# Patient Record
Sex: FEMALE | Race: WHITE | Employment: OTHER | URBAN - METROPOLITAN AREA
[De-identification: names, ages, dates, MRNs, and addresses within clinical notes are randomized per-mention and may not be internally consistent; named-entity substitution may affect disease eponyms.]

---

## 2017-12-28 ENCOUNTER — HOSPITAL ENCOUNTER (OUTPATIENT)
Dept: ULTRASOUND IMAGING | Age: 62
Discharge: HOME | End: 2017-12-28
Payer: COMMERCIAL

## 2017-12-28 DIAGNOSIS — I65.23 BILATERAL CAROTID ARTERY STENOSIS: ICD-10-CM

## 2017-12-28 PROCEDURE — 93880 EXTRACRANIAL BILAT STUDY: CPT

## 2019-05-28 LAB
ERYTHROCYTE [DISTWIDTH] IN BLOOD BY AUTOMATED COUNT: 13.5 % (ref 11.5–14)
HCT VFR BLD CALC: 39.7 % (ref 36–46)
HEMOGLOBIN: 12.6 G/DL (ref 12–16)
MCHC RBC AUTO-ENTMCNC: 31.7 G/DL (ref 32–36)
MCV RBC AUTO: 95 FL (ref 80–100)
PLATELET # BLD: 252 X10E9/L (ref 150–450)
RBC # BLD: 4.18 X10E12/L (ref 4–5.2)
T4 FREE: 0.5 NG/DL (ref 0.61–1.2)
TSH SERPL DL<=0.05 MIU/L-ACNC: 2.77 MIU/L (ref 0.44–3.9)
WBC: 4.7 X10E9/L (ref 4.4–11.3)

## 2019-10-20 ENCOUNTER — OFFICE VISIT (OUTPATIENT)
Dept: FAMILY MEDICINE CLINIC | Age: 64
End: 2019-10-20
Payer: COMMERCIAL

## 2019-10-20 VITALS
HEIGHT: 66 IN | BODY MASS INDEX: 57.36 KG/M2 | HEART RATE: 73 BPM | OXYGEN SATURATION: 95 % | SYSTOLIC BLOOD PRESSURE: 126 MMHG | TEMPERATURE: 97.9 F | DIASTOLIC BLOOD PRESSURE: 74 MMHG

## 2019-10-20 DIAGNOSIS — L03.032 CELLULITIS OF LEFT TOE: Primary | ICD-10-CM

## 2019-10-20 PROCEDURE — 99213 OFFICE O/P EST LOW 20 MIN: CPT | Performed by: NURSE PRACTITIONER

## 2019-10-20 PROCEDURE — 3017F COLORECTAL CA SCREEN DOC REV: CPT | Performed by: NURSE PRACTITIONER

## 2019-10-20 PROCEDURE — G8598 ASA/ANTIPLAT THER USED: HCPCS | Performed by: NURSE PRACTITIONER

## 2019-10-20 PROCEDURE — 4004F PT TOBACCO SCREEN RCVD TLK: CPT | Performed by: NURSE PRACTITIONER

## 2019-10-20 PROCEDURE — G8427 DOCREV CUR MEDS BY ELIG CLIN: HCPCS | Performed by: NURSE PRACTITIONER

## 2019-10-20 PROCEDURE — G8419 CALC BMI OUT NRM PARAM NOF/U: HCPCS | Performed by: NURSE PRACTITIONER

## 2019-10-20 PROCEDURE — G8484 FLU IMMUNIZE NO ADMIN: HCPCS | Performed by: NURSE PRACTITIONER

## 2019-10-20 RX ORDER — DOXYCYCLINE HYCLATE 100 MG
100 TABLET ORAL 2 TIMES DAILY
Qty: 20 TABLET | Refills: 0 | Status: SHIPPED | OUTPATIENT
Start: 2019-10-20 | End: 2019-10-30

## 2019-10-20 RX ORDER — IRBESARTAN 150 MG/1
150 TABLET ORAL DAILY
COMMUNITY
Start: 2019-09-30

## 2019-10-20 RX ORDER — CHLORTHALIDONE 25 MG/1
25 TABLET ORAL DAILY
Status: ON HOLD | COMMUNITY
Start: 2019-09-30 | End: 2021-10-22 | Stop reason: HOSPADM

## 2019-10-20 ASSESSMENT — PATIENT HEALTH QUESTIONNAIRE - PHQ9
1. LITTLE INTEREST OR PLEASURE IN DOING THINGS: 0
SUM OF ALL RESPONSES TO PHQ9 QUESTIONS 1 & 2: 0
2. FEELING DOWN, DEPRESSED OR HOPELESS: 0
SUM OF ALL RESPONSES TO PHQ QUESTIONS 1-9: 0
SUM OF ALL RESPONSES TO PHQ QUESTIONS 1-9: 0

## 2019-10-20 ASSESSMENT — ENCOUNTER SYMPTOMS: SHORTNESS OF BREATH: 0

## 2019-11-18 ENCOUNTER — OFFICE VISIT (OUTPATIENT)
Dept: NEUROLOGY | Age: 64
End: 2019-11-18
Payer: COMMERCIAL

## 2019-11-18 VITALS
SYSTOLIC BLOOD PRESSURE: 172 MMHG | DIASTOLIC BLOOD PRESSURE: 69 MMHG | BODY MASS INDEX: 47.09 KG/M2 | WEIGHT: 293 LBS | HEIGHT: 66 IN | HEART RATE: 59 BPM

## 2019-11-18 DIAGNOSIS — I65.23 CAROTID STENOSIS, BILATERAL: ICD-10-CM

## 2019-11-18 DIAGNOSIS — G40.309 NONINTRACTABLE GENERALIZED IDIOPATHIC EPILEPSY WITHOUT STATUS EPILEPTICUS (HCC): ICD-10-CM

## 2019-11-18 DIAGNOSIS — I63.139 CEREBRAL INFARCTION DUE TO EMBOLISM OF CAROTID ARTERY, UNSPECIFIED BLOOD VESSEL LATERALITY (HCC): ICD-10-CM

## 2019-11-18 PROCEDURE — 99214 OFFICE O/P EST MOD 30 MIN: CPT | Performed by: PSYCHIATRY & NEUROLOGY

## 2019-11-18 RX ORDER — COLCHICINE 0.6 MG/1
CAPSULE ORAL
Refills: 1 | COMMUNITY
Start: 2019-10-26

## 2019-11-18 ASSESSMENT — ENCOUNTER SYMPTOMS
TROUBLE SWALLOWING: 0
VOMITING: 0
SHORTNESS OF BREATH: 0
PHOTOPHOBIA: 0
NAUSEA: 0
BACK PAIN: 0
CHOKING: 0

## 2020-05-18 ENCOUNTER — OFFICE VISIT (OUTPATIENT)
Dept: NEUROLOGY | Age: 65
End: 2020-05-18
Payer: COMMERCIAL

## 2020-05-18 VITALS — DIASTOLIC BLOOD PRESSURE: 62 MMHG | SYSTOLIC BLOOD PRESSURE: 141 MMHG | HEART RATE: 62 BPM

## 2020-05-18 DIAGNOSIS — G40.309 GENERALIZED CONVULSIVE EPILEPSY (HCC): ICD-10-CM

## 2020-05-18 PROBLEM — G45.9 TIA (TRANSIENT ISCHEMIC ATTACK): Status: ACTIVE | Noted: 2020-05-18

## 2020-05-18 PROBLEM — G47.33 OBSTRUCTIVE SLEEP APNEA SYNDROME: Status: ACTIVE | Noted: 2020-05-18

## 2020-05-18 LAB
ALBUMIN SERPL-MCNC: 4 G/DL (ref 3.5–4.6)
ALP BLD-CCNC: 107 U/L (ref 40–130)
ALT SERPL-CCNC: 14 U/L (ref 0–33)
AST SERPL-CCNC: 13 U/L (ref 0–35)
BILIRUB SERPL-MCNC: <0.2 MG/DL (ref 0.2–0.7)
BILIRUBIN DIRECT: <0.2 MG/DL (ref 0–0.4)
BILIRUBIN, INDIRECT: NORMAL MG/DL (ref 0–0.6)
PHENOBARBITAL LEVEL: 17.2 UG/ML (ref 10–30)
TOTAL PROTEIN: 7.3 G/DL (ref 6.3–8)
VITAMIN D 25-HYDROXY: 23.6 NG/ML (ref 30–100)

## 2020-05-18 PROCEDURE — 3017F COLORECTAL CA SCREEN DOC REV: CPT | Performed by: PSYCHIATRY & NEUROLOGY

## 2020-05-18 PROCEDURE — 1036F TOBACCO NON-USER: CPT | Performed by: PSYCHIATRY & NEUROLOGY

## 2020-05-18 PROCEDURE — G8427 DOCREV CUR MEDS BY ELIG CLIN: HCPCS | Performed by: PSYCHIATRY & NEUROLOGY

## 2020-05-18 PROCEDURE — 99214 OFFICE O/P EST MOD 30 MIN: CPT | Performed by: PSYCHIATRY & NEUROLOGY

## 2020-05-18 PROCEDURE — G8417 CALC BMI ABV UP PARAM F/U: HCPCS | Performed by: PSYCHIATRY & NEUROLOGY

## 2020-05-18 RX ORDER — RIVAROXABAN 20 MG/1
20 TABLET, FILM COATED ORAL DAILY
COMMUNITY
Start: 2020-05-01

## 2020-05-18 NOTE — PROGRESS NOTES
Subjective:      Patient ID: Pedro Jay is a 63 y.o. female who presents today for:  Chief Complaint   Patient presents with    6 Month Follow-Up     Patient states that she is doing good but does have gout right now and seems to have it under control right now. No vision problems other then being due for new glasses. HPI's 62-year-old right-handed female history of right occipital infarct with generalized seizures and carotid stenosis. Patient continues on phenytoin 100 mg 6 at night and phenobarbital 129.6 mg by mouth. She is a seizure she denies any side effects. She is on Xarelto without any bleeding or bruising. Continue to discuss new generation anticonvulsants though this can be tano in terms of changing medications and therefore she is happy with the medication wanted to be on this medications and therefore we had recommended vitamin D and calcium supplements due to expectation of osteoporosis with phenytoin. Last documented levels in 2015'  Denies any recent falls injuries trauma no bleeding bruising choking drooling and she is independent in her activity of daily living. She is complains of visual dysfunction and is due to be seen for new glasses. Past Medical History:   Diagnosis Date    Atrial fibrillation ([address] 12/9/2014    Carotid stenosis, bilateral     Cerebral infarction due to embolism ([address]     Endometrial cancer ([address] 3/16/2015    Family history of premature CAD 12/9/2014    Generalized convulsive epilepsy ([address] 3/16/2015    Hyperlipidemia     Idiopathic generalized epilepsy ([address]     Obesity     Occipital infarction ([address] 12/9/2014     No past surgical history on file.   Social History     Socioeconomic History    Marital status: Single     Spouse name: Not on file    Number of children: Not on file    Years of education: Not on file    Highest education level: Not on file   Occupational History    Not on file   Social Needs    Financial resource strain: for levels today including vitamin D levels as well. Plan:      No orders of the defined types were placed in this encounter. No orders of the defined types were placed in this encounter. Return in about 6 months (around 11/18/2020).       Gordo Kwok MD

## 2020-11-16 ENCOUNTER — OFFICE VISIT (OUTPATIENT)
Dept: NEUROLOGY | Age: 65
End: 2020-11-16
Payer: COMMERCIAL

## 2020-11-16 VITALS
DIASTOLIC BLOOD PRESSURE: 65 MMHG | HEART RATE: 49 BPM | WEIGHT: 293 LBS | BODY MASS INDEX: 54.34 KG/M2 | SYSTOLIC BLOOD PRESSURE: 138 MMHG

## 2020-11-16 DIAGNOSIS — I63.50 CEREBRAL ARTERY OCCLUSION WITH CEREBRAL INFARCTION (HCC): ICD-10-CM

## 2020-11-16 DIAGNOSIS — G40.309 GENERALIZED CONVULSIVE EPILEPSY (HCC): Primary | ICD-10-CM

## 2020-11-16 DIAGNOSIS — G93.89 ENCEPHALOMALACIA: ICD-10-CM

## 2020-11-16 PROCEDURE — G8427 DOCREV CUR MEDS BY ELIG CLIN: HCPCS | Performed by: PSYCHIATRY & NEUROLOGY

## 2020-11-16 PROCEDURE — 3017F COLORECTAL CA SCREEN DOC REV: CPT | Performed by: PSYCHIATRY & NEUROLOGY

## 2020-11-16 PROCEDURE — G8417 CALC BMI ABV UP PARAM F/U: HCPCS | Performed by: PSYCHIATRY & NEUROLOGY

## 2020-11-16 PROCEDURE — G8484 FLU IMMUNIZE NO ADMIN: HCPCS | Performed by: PSYCHIATRY & NEUROLOGY

## 2020-11-16 PROCEDURE — 99214 OFFICE O/P EST MOD 30 MIN: CPT | Performed by: PSYCHIATRY & NEUROLOGY

## 2020-11-16 PROCEDURE — 1036F TOBACCO NON-USER: CPT | Performed by: PSYCHIATRY & NEUROLOGY

## 2020-11-16 ASSESSMENT — ENCOUNTER SYMPTOMS
CHOKING: 0
BACK PAIN: 0
TROUBLE SWALLOWING: 0
VOMITING: 0
NAUSEA: 0
PHOTOPHOBIA: 0
COLOR CHANGE: 0
SHORTNESS OF BREATH: 0

## 2020-11-16 NOTE — PROGRESS NOTES
use: No    Sexual activity: Not on file   Lifestyle    Physical activity     Days per week: Not on file     Minutes per session: Not on file    Stress: Not on file   Relationships    Social connections     Talks on phone: Not on file     Gets together: Not on file     Attends Evangelical service: Not on file     Active member of club or organization: Not on file     Attends meetings of clubs or organizations: Not on file     Relationship status: Not on file    Intimate partner violence     Fear of current or ex partner: Not on file     Emotionally abused: Not on file     Physically abused: Not on file     Forced sexual activity: Not on file   Other Topics Concern    Not on file   Social History Narrative    Not on file     Family History   Family history unknown: Yes     No Known Allergies    Current Outpatient Medications   Medication Sig Dispense Refill    XARELTO 20 MG TABS tablet Take 20 mg by mouth daily       MITIGARE 0.6 MG capsule TK ONE C PO BID PRN  1    chlorthalidone (HYGROTON) 25 MG tablet Take 25 mg by mouth daily       irbesartan (AVAPRO) 150 MG tablet Take 150 mg by mouth daily       NYSTATIN, TOPICAL, POWD Apply  topically.  PHENobarbital (LUMINAL) 64.8 MG tablet Take 129.6 mg by mouth daily.  phenytoin (DILANTIN) 100 MG ER capsule Take 100 mg by mouth daily. take 6 at night       No current facility-administered medications for this visit. Review of Systems   Constitutional: Negative for fever. HENT: Negative for ear pain, tinnitus and trouble swallowing. Eyes: Negative for photophobia and visual disturbance. Respiratory: Negative for choking and shortness of breath. Cardiovascular: Negative for chest pain and palpitations. Gastrointestinal: Negative for nausea and vomiting. Musculoskeletal: Negative for back pain, gait problem, joint swelling, myalgias, neck pain and neck stiffness. Skin: Negative for color change.    Allergic/Immunologic: Negative for food allergies. Neurological: Negative for dizziness, tremors, seizures, syncope, facial asymmetry, speech difficulty, weakness, light-headedness, numbness and headaches. Psychiatric/Behavioral: Negative for behavioral problems, confusion, hallucinations and sleep disturbance. Objective:   /65 (Site: Right Lower Arm, Position: Sitting, Cuff Size: Medium Adult)   Pulse (!) 49   Wt (!) 336 lb 11.2 oz (152.7 kg)   BMI 54.34 kg/m²     Physical Exam  Vitals signs reviewed. Eyes:      Pupils: Pupils are equal, round, and reactive to light. Neck:      Musculoskeletal: Normal range of motion. Cardiovascular:      Rate and Rhythm: Normal rate and regular rhythm. Heart sounds: No murmur. Pulmonary:      Effort: Pulmonary effort is normal.      Breath sounds: Normal breath sounds. Abdominal:      General: Bowel sounds are normal.   Musculoskeletal: Normal range of motion. Skin:     General: Skin is warm. Neurological:      Mental Status: She is alert and oriented to person, place, and time. Cranial Nerves: No cranial nerve deficit. Sensory: No sensory deficit. Motor: No abnormal muscle tone. Coordination: Coordination normal.      Deep Tendon Reflexes: Reflexes are normal and symmetric. Babinski sign absent on the right side. Babinski sign absent on the left side. Psychiatric:         Mood and Affect: Mood normal.         Us Carotid Artery Bilateral    Result Date: 12/29/2017  EXAMINATION: US CAROTID ARTERY BILATERAL: DATE AND TIME: 12/28/2017 at 11:29 AM. CLINICAL HISTORY: BILATERAL CAROTID ARTERY STENOSIS. COMPARISON: None. TECHNIQUE: Carotid duplex sonograms which include gray scale and color flow evaluation are complimented with spectral waveform analysis. Please refer to chart below for specific carotid velocity measurements.  Validated velocity measurements with angiographic measurements, velocity criteria are extrapolated from diameter data as defined by the Society of Radiologist in [facility] Radiology 2003; 396;[id number]\". FINDINGS: No significant plaque formation is seen. RIGHT ICA: < 50%. No significant plaque formation is seen. LEFT ICA: <50%. Antegrade flow in both vertebral arteries. ARTERIAL BLOOD FLOW VELOCITY RIGHT PS                                               Prox CCA    91 cm/s             Mid CCA     81 cm/s              Dist CCA    73 cm/s              Prox ICA    78 cm/s               Mid ICA     82 cm/s            Dist ICA     94 cm/s             Prox ECA    131 cm/s             Prox VERT   antegrade cm/s              ICA/CCA     1.17                         LEFT PS Prox CCA    73 cm/s Mid CCA     92 cm/s Dist CCA    53 cm/s Prox ICA    75 cm/s Mid ICA     77 cm/s Dist ICA     95 cm/s Prox ECA    77 cm/s Prox VERT   antegrade ICA/CCA     1.03     <50% STENOSIS IN THE RIGHT ICA. <50% STENOSIS IN THE LEFT ICA. The degree of stenosis recorded on this exam uses the same method of stratification used in the NASCET trials. This complies with ACR practice guidelines and the Society of radiology in ultrasound consensus statement and provides adequate information for  clinical decision making. Society of Radiologists in Ultrasound (SRU) consensus statement was used to estimate internal carotid artery stenosis.        Lab Results   Component Value Date    WBC 4.7 05/28/2019    WBC 4.9 03/05/2016    RBC 4.18 05/28/2019    HGB 12.6 05/28/2019    HCT 39.7 05/28/2019    MCV 95 05/28/2019    MCH 31.0 03/05/2016    MCHC 31.7 05/28/2019    RDW 13.7 03/05/2016     05/28/2019    MPV 8.4 10/28/2014     Lab Results   Component Value Date     04/15/2016    K 4.3 04/15/2016     04/15/2016    CO2 22 04/15/2016    BUN 23 04/15/2016    CREATININE 1.07 04/15/2016    GFRAA >60.0 04/15/2016    LABGLOM 52.4 04/15/2016    GLUCOSE 106 03/03/2016    PROT 7.3 05/18/2020    LABALBU 4.0 05/18/2020    CALCIUM 8.5 03/03/2016    BILITOT <0.2 05/18/2020    ALKPHOS 107 05/18/2020    AST 13 05/18/2020    ALT 14 05/18/2020     Lab Results   Component Value Date    PROTIME 10.9 03/01/2016    INR 1.0 03/01/2016     Lab Results   Component Value Date    TSH 2.77 05/28/2019     Lab Results   Component Value Date    TRIG 152 03/01/2016    HDL 45 03/01/2016    LDLCALC 150 03/01/2016     No results found for: Freeda Blades, LABBENZ, CANNAB, COCAINESCRN, LABMETH, OPIATESCREENURINE, PHENCYCLIDINESCREENURINE, PPXUR, ETOH  No results found for: LITHIUM, DILFRTOT, VALPROATE    Assessment:       Diagnosis Orders   1. Generalized convulsive epilepsy (HCC)  Phenytoin Level, Total    Phenobarbital Level    CBC Auto Differential    Hepatic Function Panel   2. Cerebral artery occlusion with cerebral infarction ([facility]     3. Encephalomalacia     Poststroke seizures patient last seen in August 2020. Patient continues on phenobarbital and Dilantin will continue to be on the same dose for many years. She is somewhat resistant to change in medications as we had recommended changing her Dilantin given her age as this may cause osteoporosis she is recommended take vitamin D and calcium which she tells me she is taking. Is happy with the medication therefore we had not changed this. Patient has a large encephalomalacia in the left with a stroke in the past.  This likely is epileptogenic. We have continue to discuss that she should obtain liver test which she has never done in the last past months. We will again continue to follow her she does report that she has some occasional seizures but she is not concerned. Plan:      Orders Placed This Encounter   Procedures    Phenytoin Level, Total     Copy to Dr Debi Rodríguez     Standing Status:   Future     Standing Expiration Date:   11/16/2021     Order Specific Question:   Dose Schedule & Time of Last Dose?      Answer:   morning dose    Phenobarbital Level     Copy to Dr Debi Rodríguez     Standing Status:   Future     Standing Expiration Date:   11/16/2021     Order Specific Question:   Dose Schedule & Time of Last Dose? Answer:   morning dose    CBC Auto Differential     Copy to Dr Cody Velasquez     Standing Status:   Future     Standing Expiration Date:   11/16/2021    Hepatic Function Panel     Results to Dr Cody Velasquez     Standing Status:   Future     Standing Expiration Date:   11/16/2021     No orders of the defined types were placed in this encounter. Return in about 6 months (around 5/16/2021).       Mel Phan MD

## 2021-01-24 PROBLEM — G93.89 ENCEPHALOMALACIA: Status: ACTIVE | Noted: 2021-01-24

## 2021-09-29 ENCOUNTER — OFFICE VISIT (OUTPATIENT)
Dept: NEUROLOGY | Age: 66
End: 2021-09-29
Payer: COMMERCIAL

## 2021-09-29 VITALS — DIASTOLIC BLOOD PRESSURE: 94 MMHG | SYSTOLIC BLOOD PRESSURE: 150 MMHG | OXYGEN SATURATION: 94 % | HEART RATE: 61 BPM

## 2021-09-29 DIAGNOSIS — G40.309 NONINTRACTABLE GENERALIZED IDIOPATHIC EPILEPSY WITHOUT STATUS EPILEPTICUS (HCC): Primary | ICD-10-CM

## 2021-09-29 DIAGNOSIS — G47.33 OBSTRUCTIVE SLEEP APNEA SYNDROME: ICD-10-CM

## 2021-09-29 DIAGNOSIS — I63.139 CEREBRAL INFARCTION DUE TO EMBOLISM OF CAROTID ARTERY, UNSPECIFIED BLOOD VESSEL LATERALITY (HCC): ICD-10-CM

## 2021-09-29 DIAGNOSIS — G45.9 TIA (TRANSIENT ISCHEMIC ATTACK): ICD-10-CM

## 2021-09-29 DIAGNOSIS — E66.8 EXTREME OBESITY: ICD-10-CM

## 2021-09-29 DIAGNOSIS — G93.89 ENCEPHALOMALACIA: ICD-10-CM

## 2021-09-29 PROCEDURE — G8427 DOCREV CUR MEDS BY ELIG CLIN: HCPCS | Performed by: PSYCHIATRY & NEUROLOGY

## 2021-09-29 PROCEDURE — G8417 CALC BMI ABV UP PARAM F/U: HCPCS | Performed by: PSYCHIATRY & NEUROLOGY

## 2021-09-29 PROCEDURE — 99214 OFFICE O/P EST MOD 30 MIN: CPT | Performed by: PSYCHIATRY & NEUROLOGY

## 2021-09-29 PROCEDURE — 3017F COLORECTAL CA SCREEN DOC REV: CPT | Performed by: PSYCHIATRY & NEUROLOGY

## 2021-09-29 PROCEDURE — 1036F TOBACCO NON-USER: CPT | Performed by: PSYCHIATRY & NEUROLOGY

## 2021-09-29 ASSESSMENT — ENCOUNTER SYMPTOMS
COLOR CHANGE: 0
PHOTOPHOBIA: 0
CHOKING: 0
BACK PAIN: 0
SHORTNESS OF BREATH: 0
VOMITING: 0
NAUSEA: 0
TROUBLE SWALLOWING: 0

## 2021-09-29 NOTE — PROGRESS NOTES
Subjective:      Patient ID: Stanley Duckworth is a 61 y.o. female who presents today for:  Chief Complaint   Patient presents with    6 Month Follow-Up     Pt states she hasnt had any recent seizures but a couple weeks ago she felt she couldnt wake up she states shes been really dizzy and fatigued but doesnt know if its caused by medications. She states she had one fall and shes dizzy and unsteady on her feet. she states shes always dizzy now. pts sister states she is really concerened. HPI 62-year-old right-handed female with a history of right occipital parietal infarct with a large intracerebral hemorrhage with a transformation generalized seizures. Patient continues on Dilantin. She has dizziness. She did not want to change the Dilantin and she is happy with the medication we had recommended continuation of vitamin D and calcium. The dizziness wasevaluated and does not appear to be orthostatic. Her supine blood pressure is [phone] with a standing blood pressure of 150/94 and this is orthostasis. Carotid ultrasounds are not done her last phenobarbital levels have not been done. Patient continued to be dizzy on and off for the last 2 weeks she had dizziness some years ago which was considered to be vestibular. Patient is not complain tinnitus or hearing loss. Otherwise she does not any issues except she has hypersomnolence and she is not using her CPAP machine as much. Past Medical History:   Diagnosis Date    Atrial fibrillation ([address] 12/9/2014    Carotid stenosis, bilateral     Cerebral infarction due to embolism ([address]     Endometrial cancer ([address] 3/16/2015    Family history of premature CAD 12/9/2014    Generalized convulsive epilepsy ([address] 3/16/2015    Hyperlipidemia     Idiopathic generalized epilepsy ([address]     Obesity     Occipital infarction ([address] 12/9/2014     No past surgical history on file.   Social History     Socioeconomic History    Marital status: Single     Spouse name: Not on file    Number of children: Not on file    Years of education: Not on file    Highest education level: Not on file   Occupational History    Not on file   Tobacco Use    Smoking status: Never Smoker    Smokeless tobacco: Never Used   Substance and Sexual Activity    Alcohol use: No    Drug use: No    Sexual activity: Not on file   Other Topics Concern    Not on file   Social History Narrative    Not on file     Social Determinants of Health     Financial Resource Strain:     Difficulty of Paying Living Expenses:    Food Insecurity:     Worried About Running Out of Food in the Last Year:     [address] in the Last Year:    Transportation Needs:     Lack of Transportation (Medical):  Lack of Transportation (Non-Medical):    Physical Activity:     Days of Exercise per Week:     Minutes of Exercise per Session:    Stress:     Feeling of Stress :    Social Connections:     Frequency of Communication with Friends and Family:     Frequency of Social Gatherings with Friends and Family:     Attends Synagogue Services:     Active Member of Clubs or Organizations:     Attends Club or Organization Meetings:     Marital Status:    Intimate Partner Violence:     Fear of Current or Ex-Partner:     Emotionally Abused:     Physically Abused:     Sexually Abused:      Family History   Family history unknown: Yes     No Known Allergies    Current Outpatient Medications   Medication Sig Dispense Refill    XARELTO 20 MG TABS tablet Take 20 mg by mouth daily       MITIGARE 0.6 MG capsule TK ONE C PO BID PRN  1    chlorthalidone (HYGROTON) 25 MG tablet Take 25 mg by mouth daily       irbesartan (AVAPRO) 150 MG tablet Take 150 mg by mouth daily       NYSTATIN, TOPICAL, POWD Apply  topically.  PHENobarbital (LUMINAL) 64.8 MG tablet Take 129.6 mg by mouth daily.  phenytoin (DILANTIN) 100 MG ER capsule Take 100 mg by mouth daily.  take 6 at night       No current facility-administered medications for this visit. Review of Systems   Constitutional: Negative for fever. HENT: Negative for ear pain, tinnitus and trouble swallowing. Eyes: Negative for photophobia and visual disturbance. Respiratory: Negative for choking and shortness of breath. Cardiovascular: Negative for chest pain and palpitations. Gastrointestinal: Negative for nausea and vomiting. Musculoskeletal: Positive for gait problem. Negative for back pain, joint swelling, myalgias, neck pain and neck stiffness. Skin: Negative for color change. Allergic/Immunologic: Negative for food allergies. Neurological: Positive for dizziness and weakness. Negative for tremors, seizures, syncope, facial asymmetry, speech difficulty, light-headedness, numbness and headaches. Psychiatric/Behavioral: Negative for behavioral problems, confusion, hallucinations and sleep disturbance. Objective:   BP (!) 150/94 (Site: Left Lower Arm, Position: Standing, Cuff Size: Large Adult)   Pulse 61   SpO2 94%     Physical Exam  Vitals reviewed. Eyes:      Pupils: Pupils are equal, round, and reactive to light. Cardiovascular:      Rate and Rhythm: Normal rate and regular rhythm. Heart sounds: No murmur heard. Pulmonary:      Effort: Pulmonary effort is normal.      Breath sounds: Normal breath sounds. Abdominal:      General: Bowel sounds are normal.   Musculoskeletal:         General: Normal range of motion. Cervical back: Normal range of motion. Skin:     General: Skin is warm. Neurological:      Mental Status: She is alert and oriented to person, place, and time. Cranial Nerves: No cranial nerve deficit. Sensory: No sensory deficit. Motor: No abnormal muscle tone. Coordination: Coordination normal.      Deep Tendon Reflexes: Reflexes are normal and symmetric. Babinski sign absent on the right side. Babinski sign absent on the left side.       Comments: Patient is morbidly obese and her blood pressure recordings are in my H&P rest of the examination is entirely normal she is areflexic in the lower extremities some gait ataxia. Psychiatric:         Mood and Affect: Mood normal.         US CAROTID ARTERY BILATERAL    Result Date: 12/29/2017  EXAMINATION: US CAROTID ARTERY BILATERAL: DATE AND TIME: 12/28/2017 at 11:29 AM. CLINICAL HISTORY: BILATERAL CAROTID ARTERY STENOSIS. COMPARISON: None. TECHNIQUE: Carotid duplex sonograms which include gray scale and color flow evaluation are complimented with spectral waveform analysis. Please refer to chart below for specific carotid velocity measurements. Validated velocity measurements with angiographic measurements, velocity criteria are extrapolated from diameter data as defined by the Society of Radiologist in [address] [place] [address] Radiology 2003; 357;[phone]\". FINDINGS: No significant plaque formation is seen. RIGHT ICA: < 50%. No significant plaque formation is seen. LEFT ICA: <50%. Antegrade flow in both vertebral arteries. ARTERIAL BLOOD FLOW VELOCITY RIGHT PS                                               Prox CCA    91 cm/s             Mid CCA     81 cm/s              Dist CCA    73 cm/s              Prox ICA    78 cm/s               Mid ICA     82 cm/s            Dist ICA     94 cm/s             Prox ECA    131 cm/s             Prox VERT   antegrade cm/s              ICA/CCA     1.17                         LEFT PS Prox CCA    73 cm/s Mid CCA     92 cm/s Dist CCA    53 cm/s Prox ICA    75 cm/s Mid ICA     77 cm/s Dist ICA     95 cm/s Prox ECA    77 cm/s Prox VERT   antegrade ICA/CCA     1.03     <50% STENOSIS IN THE RIGHT ICA. <50% STENOSIS IN THE LEFT ICA. The degree of stenosis recorded on this exam uses the same method of stratification used in the NASCET trials. This complies with ACR practice guidelines and the Society of radiology in ultrasound consensus statement and provides adequate information for  clinical decision making. Society of Radiologists in Ultrasound (SRU) consensus statement was used to estimate internal carotid artery stenosis. Lab Results   Component Value Date    WBC 4.7 05/28/2019    WBC 4.9 03/05/2016    RBC 4.18 05/28/2019    HGB 12.6 05/28/2019    HCT 39.7 05/28/2019    MCV 95 05/28/2019    MCH 31.0 03/05/2016    MCHC 31.7 05/28/2019    RDW 13.7 03/05/2016     05/28/2019    MPV 8.4 10/28/2014     Lab Results   Component Value Date     04/15/2016    K 4.3 04/15/2016     04/15/2016    CO2 22 04/15/2016    BUN 23 04/15/2016    CREATININE 1.07 04/15/2016    GFRAA >60.0 04/15/2016    LABGLOM 52.4 04/15/2016    GLUCOSE 106 03/03/2016    PROT 7.3 05/18/2020    LABALBU 4.0 05/18/2020    CALCIUM 8.5 03/03/2016    BILITOT <0.2 05/18/2020    ALKPHOS 107 05/18/2020    AST 13 05/18/2020    ALT 14 05/18/2020     Lab Results   Component Value Date    PROTIME 10.9 03/01/2016    INR 1.0 03/01/2016     Lab Results   Component Value Date    TSH 2.77 05/28/2019     Lab Results   Component Value Date    TRIG 152 03/01/2016    HDL 45 03/01/2016    LDLCALC 150 03/01/2016     No results found for: Beryle Moran, LABBENZ, CANNAB, COCAINESCRN, LABMETH, OPIATESCREENURINE, PHENCYCLIDINESCREENURINE, PPXUR, ETOH  No results found for: LITHIUM, DILFRTOT, VALPROATE    Assessment:       Diagnosis Orders   1. Nonintractable generalized idiopathic epilepsy without status epilepticus ([address]     2. Encephalomalacia     3. 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 TIA (transient ischemic attack)     5. Extreme obesity     6. Obstructive sleep apnea syndrome     Intractable epilepsy which has not reoccurred. Patient is on combination of Dilantin and phenobarbital.  She has multiple other medical issues of concern and she has orthostatics. This may be causing some of her dizziness but she is not becoming bradycardic or hypotensive.   This may need to be monitored medically. I am not quite sure what to add in this situation though I will obtain dilantin  which can cause this as well. Patient was seen here for vascular disease and requires a carotid ultrasound which she had not had. Patient complains of hypersomnolence and sleeps a lot and is likely that she is not using her CPAP machine as she should which is causing issues  Dizziness continues we may have to reevaluate her again      Plan:      No orders of the defined types were placed in this encounter. No orders of the defined types were placed in this encounter. No follow-ups on file.       Sophia Mora MD

## 2021-10-04 DIAGNOSIS — G40.309 GENERALIZED CONVULSIVE EPILEPSY (HCC): Primary | ICD-10-CM

## 2021-10-06 ENCOUNTER — TELEPHONE (OUTPATIENT)
Dept: NEUROLOGY | Age: 66
End: 2021-10-06

## 2021-10-06 RX ORDER — PHENYTOIN SODIUM 100 MG/1
600 CAPSULE, EXTENDED RELEASE ORAL DAILY
Qty: 180 CAPSULE | Refills: 3 | Status: SHIPPED | OUTPATIENT
Start: 2021-10-06 | End: 2022-09-28

## 2021-10-06 NOTE — TELEPHONE ENCOUNTER
Pt seen her PCP today and she looked at her recent blood work and her dilantin level is elevated. She is having some dizziness going on.patIs there any changes that you would like to make at this time.

## 2021-10-08 ENCOUNTER — TELEPHONE (OUTPATIENT)
Dept: NEUROLOGY | Age: 66
End: 2021-10-08

## 2021-10-08 NOTE — TELEPHONE ENCOUNTER
Pt's dilantin was even higher than first testing this time, spoke to Dr. Jose Curry in office and he informed to have pt lower medication to 400mg daily and retest in a week. Also told if she is still feeling as badly as she is to give us a call as she noted a lot of dizziness, and sick feeling. Order for lab is pended in this encounter as well. You are aware of this already. Thank you.

## 2021-10-18 DIAGNOSIS — G40.309 GENERALIZED CONVULSIVE EPILEPSY (HCC): ICD-10-CM

## 2021-10-18 LAB — PHENYTOIN LEVEL: 21.6 UG/ML (ref 10–20)

## 2021-10-19 ENCOUNTER — HOSPITAL ENCOUNTER (INPATIENT)
Age: 66
LOS: 3 days | Discharge: HOME HEALTH | DRG: 641 | End: 2021-10-22
Attending: EMERGENCY MEDICINE | Admitting: INTERNAL MEDICINE
Payer: COMMERCIAL

## 2021-10-19 ENCOUNTER — HOSPITAL ENCOUNTER (OUTPATIENT)
Dept: GENERAL RADIOLOGY | Age: 66
Discharge: HOME | DRG: 641 | End: 2021-10-21
Payer: COMMERCIAL

## 2021-10-19 ENCOUNTER — APPOINTMENT (OUTPATIENT)
Dept: GENERAL RADIOLOGY | Age: 66
DRG: 641 | End: 2021-10-19
Payer: COMMERCIAL

## 2021-10-19 DIAGNOSIS — E87.1 HYPONATREMIA: Primary | ICD-10-CM

## 2021-10-19 DIAGNOSIS — R05.9 COUGH: ICD-10-CM

## 2021-10-19 DIAGNOSIS — J06.9 VIRAL UPPER RESPIRATORY TRACT INFECTION: ICD-10-CM

## 2021-10-19 LAB
ALBUMIN SERPL-MCNC: 4 G/DL (ref 3.5–4.6)
ALP BLD-CCNC: 106 U/L (ref 40–130)
ALT SERPL-CCNC: 19 U/L (ref 0–33)
ANION GAP SERPL CALCULATED.3IONS-SCNC: 8 MEQ/L (ref 9–15)
AST SERPL-CCNC: 22 U/L (ref 0–35)
BASOPHILS ABSOLUTE: 0 K/UL (ref 0–0.2)
BASOPHILS RELATIVE PERCENT: 0.8 %
BILIRUB SERPL-MCNC: <0.2 MG/DL (ref 0.2–0.7)
BILIRUBIN URINE: NEGATIVE
BLOOD, URINE: NEGATIVE
BUN BLDV-MCNC: 30 MG/DL (ref 8–23)
CALCIUM SERPL-MCNC: 8.5 MG/DL (ref 8.5–9.9)
CHLORIDE BLD-SCNC: 85 MEQ/L (ref 95–107)
CLARITY: CLEAR
CO2: 24 MEQ/L (ref 20–31)
COLOR: YELLOW
CREAT SERPL-MCNC: 1.26 MG/DL (ref 0.5–0.9)
CREATININE URINE: 50 MG/DL
EOSINOPHILS ABSOLUTE: 0.3 K/UL (ref 0–0.7)
EOSINOPHILS RELATIVE PERCENT: 5.9 %
GFR AFRICAN AMERICAN: 51.6
GFR NON-AFRICAN AMERICAN: 42.7
GLOBULIN: 3 G/DL (ref 2.3–3.5)
GLUCOSE BLD-MCNC: 97 MG/DL (ref 70–99)
GLUCOSE URINE: NEGATIVE MG/DL
HCT VFR BLD CALC: 32.5 % (ref 37–47)
HEMOGLOBIN: 11.3 G/DL (ref 12–16)
KETONES, URINE: NEGATIVE MG/DL
LEUKOCYTE ESTERASE, URINE: NEGATIVE
LYMPHOCYTES ABSOLUTE: 1.1 K/UL (ref 1–4.8)
LYMPHOCYTES RELATIVE PERCENT: 26.5 %
MCH RBC QN AUTO: 31.4 PG (ref 27–31.3)
MCHC RBC AUTO-ENTMCNC: 34.6 % (ref 33–37)
MCV RBC AUTO: 90.7 FL (ref 82–100)
MONOCYTES ABSOLUTE: 0.8 K/UL (ref 0.2–0.8)
MONOCYTES RELATIVE PERCENT: 18.7 %
NEUTROPHILS ABSOLUTE: 2.1 K/UL (ref 1.4–6.5)
NEUTROPHILS RELATIVE PERCENT: 48.1 %
NITRITE, URINE: NEGATIVE
PDW BLD-RTO: 13.9 % (ref 11.5–14.5)
PH UA: 5 (ref 5–9)
PLATELET # BLD: 278 K/UL (ref 130–400)
POTASSIUM SERPL-SCNC: 4.5 MEQ/L (ref 3.4–4.9)
PROTEIN UA: NEGATIVE MG/DL
RBC # BLD: 3.59 M/UL (ref 4.2–5.4)
SODIUM BLD-SCNC: 117 MEQ/L (ref 135–144)
SODIUM URINE: 39 MEQ/L
SPECIFIC GRAVITY UA: 1.01 (ref 1–1.03)
TOTAL PROTEIN: 7 G/DL (ref 6.3–8)
URINE REFLEX TO CULTURE: NORMAL
UROBILINOGEN, URINE: 0.2 E.U./DL
WBC # BLD: 4.3 K/UL (ref 4.8–10.8)

## 2021-10-19 PROCEDURE — 1210000000 HC MED SURG R&B

## 2021-10-19 PROCEDURE — 84300 ASSAY OF URINE SODIUM: CPT

## 2021-10-19 PROCEDURE — 80053 COMPREHEN METABOLIC PANEL: CPT

## 2021-10-19 PROCEDURE — 82570 ASSAY OF URINE CREATININE: CPT

## 2021-10-19 PROCEDURE — 93005 ELECTROCARDIOGRAM TRACING: CPT | Performed by: EMERGENCY MEDICINE

## 2021-10-19 PROCEDURE — 71046 X-RAY EXAM CHEST 2 VIEWS: CPT

## 2021-10-19 PROCEDURE — 99285 EMERGENCY DEPT VISIT HI MDM: CPT

## 2021-10-19 PROCEDURE — 36415 COLL VENOUS BLD VENIPUNCTURE: CPT

## 2021-10-19 PROCEDURE — 81003 URINALYSIS AUTO W/O SCOPE: CPT

## 2021-10-19 PROCEDURE — 85025 COMPLETE CBC W/AUTO DIFF WBC: CPT

## 2021-10-19 PROCEDURE — 83935 ASSAY OF URINE OSMOLALITY: CPT

## 2021-10-19 RX ORDER — MULTIVIT-MIN/IRON/FOLIC ACID/K 18-600-40
CAPSULE ORAL DAILY
COMMUNITY

## 2021-10-19 ASSESSMENT — PAIN SCALES - GENERAL: PAINLEVEL_OUTOF10: 0

## 2021-10-19 NOTE — ED TRIAGE NOTES
Arrived to the ER for abnormal labs. States was at PCP at the end of September and reports lab work and had no issues. Last week had lab work done and dilantin level was elevated so had dilantin level adjusted and increased water intake. Then over the last week has been having increase in fatigue and labs were repeated. Called by PCP for abnormal sodium level of 120. Denies CP, SOB, n/v, diarrhea.

## 2021-10-20 ENCOUNTER — APPOINTMENT (OUTPATIENT)
Dept: CT IMAGING | Age: 66
DRG: 641 | End: 2021-10-20
Payer: COMMERCIAL

## 2021-10-20 LAB
ADENOVIRUS BY PCR: NOT DETECTED
ALBUMIN SERPL-MCNC: 3.6 G/DL (ref 3.5–4.6)
ALP BLD-CCNC: 94 U/L (ref 40–130)
ALT SERPL-CCNC: 17 U/L (ref 0–33)
ANION GAP SERPL CALCULATED.3IONS-SCNC: 11 MEQ/L (ref 9–15)
ANION GAP SERPL CALCULATED.3IONS-SCNC: 12 MEQ/L (ref 9–15)
ANION GAP SERPL CALCULATED.3IONS-SCNC: 13 MEQ/L (ref 9–15)
AST SERPL-CCNC: 19 U/L (ref 0–35)
BASOPHILS ABSOLUTE: 0 K/UL (ref 0–0.2)
BASOPHILS RELATIVE PERCENT: 0.7 %
BILIRUB SERPL-MCNC: <0.2 MG/DL (ref 0.2–0.7)
BORDETELLA PARAPERTUSSIS BY PCR: NOT DETECTED
BORDETELLA PERTUSSIS BY PCR: NOT DETECTED
BUN BLDV-MCNC: 26 MG/DL (ref 8–23)
BUN BLDV-MCNC: 27 MG/DL (ref 8–23)
BUN BLDV-MCNC: 27 MG/DL (ref 8–23)
BUN BLDV-MCNC: 28 MG/DL (ref 8–23)
BUN BLDV-MCNC: 29 MG/DL (ref 8–23)
CALCIUM SERPL-MCNC: 8.2 MG/DL (ref 8.5–9.9)
CALCIUM SERPL-MCNC: 8.3 MG/DL (ref 8.5–9.9)
CALCIUM SERPL-MCNC: 8.4 MG/DL (ref 8.5–9.9)
CALCIUM SERPL-MCNC: 8.4 MG/DL (ref 8.5–9.9)
CALCIUM SERPL-MCNC: 8.5 MG/DL (ref 8.5–9.9)
CHLAMYDOPHILIA PNEUMONIAE BY PCR: NOT DETECTED
CHLORIDE BLD-SCNC: 84 MEQ/L (ref 95–107)
CHLORIDE BLD-SCNC: 85 MEQ/L (ref 95–107)
CHLORIDE BLD-SCNC: 87 MEQ/L (ref 95–107)
CHLORIDE BLD-SCNC: 87 MEQ/L (ref 95–107)
CHLORIDE BLD-SCNC: 89 MEQ/L (ref 95–107)
CO2: 23 MEQ/L (ref 20–31)
CO2: 24 MEQ/L (ref 20–31)
CO2: 25 MEQ/L (ref 20–31)
CORONAVIRUS 229E BY PCR: NOT DETECTED
CORONAVIRUS HKU1 BY PCR: NOT DETECTED
CORONAVIRUS NL63 BY PCR: NOT DETECTED
CORONAVIRUS OC43 BY PCR: NOT DETECTED
CREAT SERPL-MCNC: 1.01 MG/DL (ref 0.5–0.9)
CREAT SERPL-MCNC: 1.07 MG/DL (ref 0.5–0.9)
CREAT SERPL-MCNC: 1.1 MG/DL (ref 0.5–0.9)
CREAT SERPL-MCNC: 1.25 MG/DL (ref 0.5–0.9)
CREAT SERPL-MCNC: 1.35 MG/DL (ref 0.5–0.9)
CREATININE URINE: 49.1 MG/DL
EKG ATRIAL RATE: 54 BPM
EKG ATRIAL RATE: 55 BPM
EKG P AXIS: -8 DEGREES
EKG P AXIS: 47 DEGREES
EKG P-R INTERVAL: 140 MS
EKG P-R INTERVAL: 148 MS
EKG Q-T INTERVAL: 434 MS
EKG Q-T INTERVAL: 460 MS
EKG QRS DURATION: 122 MS
EKG QRS DURATION: 128 MS
EKG QTC CALCULATION (BAZETT): 415 MS
EKG QTC CALCULATION (BAZETT): 436 MS
EKG R AXIS: 10 DEGREES
EKG R AXIS: 15 DEGREES
EKG T AXIS: 13 DEGREES
EKG T AXIS: 2 DEGREES
EKG VENTRICULAR RATE: 54 BPM
EKG VENTRICULAR RATE: 55 BPM
EOSINOPHILS ABSOLUTE: 0.3 K/UL (ref 0–0.7)
EOSINOPHILS RELATIVE PERCENT: 6.3 %
GFR AFRICAN AMERICAN: 47.7
GFR AFRICAN AMERICAN: 52.1
GFR AFRICAN AMERICAN: >60
GFR NON-AFRICAN AMERICAN: 39.4
GFR NON-AFRICAN AMERICAN: 43
GFR NON-AFRICAN AMERICAN: 49.9
GFR NON-AFRICAN AMERICAN: 51.5
GFR NON-AFRICAN AMERICAN: 55.1
GLOBULIN: 2.3 G/DL (ref 2.3–3.5)
GLUCOSE BLD-MCNC: 93 MG/DL (ref 70–99)
GLUCOSE BLD-MCNC: 94 MG/DL (ref 70–99)
GLUCOSE BLD-MCNC: 95 MG/DL (ref 70–99)
GLUCOSE BLD-MCNC: 95 MG/DL (ref 70–99)
GLUCOSE BLD-MCNC: 97 MG/DL (ref 70–99)
HCT VFR BLD CALC: 32 % (ref 37–47)
HEMOGLOBIN: 11.1 G/DL (ref 12–16)
HUMAN METAPNEUMOVIRUS BY PCR: NOT DETECTED
HUMAN RHINOVIRUS/ENTEROVIRUS BY PCR: DETECTED
INFLUENZA A BY PCR: NOT DETECTED
INFLUENZA B BY PCR: NOT DETECTED
LYMPHOCYTES ABSOLUTE: 1.1 K/UL (ref 1–4.8)
LYMPHOCYTES RELATIVE PERCENT: 27 %
MAGNESIUM: 1.5 MG/DL (ref 1.7–2.4)
MCH RBC QN AUTO: 32.1 PG (ref 27–31.3)
MCHC RBC AUTO-ENTMCNC: 34.6 % (ref 33–37)
MCV RBC AUTO: 92.8 FL (ref 82–100)
MONOCYTES ABSOLUTE: 0.6 K/UL (ref 0.2–0.8)
MONOCYTES RELATIVE PERCENT: 14.1 %
MYCOPLASMA PNEUMONIAE BY PCR: NOT DETECTED
NEUTROPHILS ABSOLUTE: 2.1 K/UL (ref 1.4–6.5)
NEUTROPHILS RELATIVE PERCENT: 51.9 %
PARAINFLUENZA VIRUS 1 BY PCR: NOT DETECTED
PARAINFLUENZA VIRUS 2 BY PCR: NOT DETECTED
PARAINFLUENZA VIRUS 3 BY PCR: NOT DETECTED
PARAINFLUENZA VIRUS 4 BY PCR: NOT DETECTED
PDW BLD-RTO: 14.1 % (ref 11.5–14.5)
PLATELET # BLD: 242 K/UL (ref 130–400)
POTASSIUM SERPL-SCNC: 4.6 MEQ/L (ref 3.4–4.9)
POTASSIUM SERPL-SCNC: 4.6 MEQ/L (ref 3.4–4.9)
POTASSIUM SERPL-SCNC: 4.7 MEQ/L (ref 3.4–4.9)
POTASSIUM SERPL-SCNC: 4.7 MEQ/L (ref 3.4–4.9)
POTASSIUM SERPL-SCNC: 5 MEQ/L (ref 3.4–4.9)
PROLACTIN: 10.8 NG/ML
RBC # BLD: 3.45 M/UL (ref 4.2–5.4)
RESPIRATORY SYNCYTIAL VIRUS BY PCR: NOT DETECTED
SARS-COV-2, PCR: NOT DETECTED
SODIUM BLD-SCNC: 120 MEQ/L (ref 135–144)
SODIUM BLD-SCNC: 121 MEQ/L (ref 135–144)
SODIUM BLD-SCNC: 122 MEQ/L (ref 135–144)
SODIUM BLD-SCNC: 123 MEQ/L (ref 135–144)
SODIUM BLD-SCNC: 124 MEQ/L (ref 135–144)
SODIUM URINE: 37 MEQ/L
TOTAL PROTEIN: 5.9 G/DL (ref 6.3–8)
TSH SERPL DL<=0.05 MIU/L-ACNC: 1.82 UIU/ML (ref 0.44–3.86)
UREA NITROGEN, UR: 365 MG/DL
WBC # BLD: 4.1 K/UL (ref 4.8–10.8)

## 2021-10-20 PROCEDURE — 99223 1ST HOSP IP/OBS HIGH 75: CPT | Performed by: PSYCHIATRY & NEUROLOGY

## 2021-10-20 PROCEDURE — 97166 OT EVAL MOD COMPLEX 45 MIN: CPT

## 2021-10-20 PROCEDURE — 84146 ASSAY OF PROLACTIN: CPT

## 2021-10-20 PROCEDURE — 82570 ASSAY OF URINE CREATININE: CPT

## 2021-10-20 PROCEDURE — 83735 ASSAY OF MAGNESIUM: CPT

## 2021-10-20 PROCEDURE — 93010 ELECTROCARDIOGRAM REPORT: CPT | Performed by: INTERNAL MEDICINE

## 2021-10-20 PROCEDURE — 93005 ELECTROCARDIOGRAM TRACING: CPT | Performed by: INTERNAL MEDICINE

## 2021-10-20 PROCEDURE — 36415 COLL VENOUS BLD VENIPUNCTURE: CPT

## 2021-10-20 PROCEDURE — 2500000003 HC RX 250 WO HCPCS: Performed by: INTERNAL MEDICINE

## 2021-10-20 PROCEDURE — 1210000000 HC MED SURG R&B

## 2021-10-20 PROCEDURE — 2580000003 HC RX 258: Performed by: INTERNAL MEDICINE

## 2021-10-20 PROCEDURE — 70450 CT HEAD/BRAIN W/O DYE: CPT

## 2021-10-20 PROCEDURE — 84540 ASSAY OF URINE/UREA-N: CPT

## 2021-10-20 PROCEDURE — 84443 ASSAY THYROID STIM HORMONE: CPT

## 2021-10-20 PROCEDURE — 84300 ASSAY OF URINE SODIUM: CPT

## 2021-10-20 PROCEDURE — 97162 PT EVAL MOD COMPLEX 30 MIN: CPT

## 2021-10-20 PROCEDURE — APPSS45 APP SPLIT SHARED TIME 31-45 MINUTES: Performed by: STUDENT IN AN ORGANIZED HEALTH CARE EDUCATION/TRAINING PROGRAM

## 2021-10-20 PROCEDURE — 85025 COMPLETE CBC W/AUTO DIFF WBC: CPT

## 2021-10-20 PROCEDURE — 6370000000 HC RX 637 (ALT 250 FOR IP): Performed by: INTERNAL MEDICINE

## 2021-10-20 PROCEDURE — 80053 COMPREHEN METABOLIC PANEL: CPT

## 2021-10-20 PROCEDURE — 0202U NFCT DS 22 TRGT SARS-COV-2: CPT

## 2021-10-20 RX ORDER — SODIUM CHLORIDE 0.9 % (FLUSH) 0.9 %
5-40 SYRINGE (ML) INJECTION EVERY 12 HOURS SCHEDULED
Status: DISCONTINUED | OUTPATIENT
Start: 2021-10-20 | End: 2021-10-22 | Stop reason: HOSPADM

## 2021-10-20 RX ORDER — SODIUM CHLORIDE 9 MG/ML
INJECTION, SOLUTION INTRAVENOUS CONTINUOUS
Status: DISCONTINUED | OUTPATIENT
Start: 2021-10-20 | End: 2021-10-22 | Stop reason: HOSPADM

## 2021-10-20 RX ORDER — 3% SODIUM CHLORIDE 3 G/100ML
50 INJECTION, SOLUTION INTRAVENOUS ONCE
Status: COMPLETED | OUTPATIENT
Start: 2021-10-20 | End: 2021-10-20

## 2021-10-20 RX ORDER — POLYETHYLENE GLYCOL 3350 17 G/17G
17 POWDER, FOR SOLUTION ORAL DAILY PRN
Status: DISCONTINUED | OUTPATIENT
Start: 2021-10-20 | End: 2021-10-22 | Stop reason: HOSPADM

## 2021-10-20 RX ORDER — ONDANSETRON 2 MG/ML
4 INJECTION INTRAMUSCULAR; INTRAVENOUS EVERY 6 HOURS PRN
Status: DISCONTINUED | OUTPATIENT
Start: 2021-10-20 | End: 2021-10-22 | Stop reason: HOSPADM

## 2021-10-20 RX ORDER — SODIUM CHLORIDE 9 MG/ML
25 INJECTION, SOLUTION INTRAVENOUS PRN
Status: DISCONTINUED | OUTPATIENT
Start: 2021-10-20 | End: 2021-10-22 | Stop reason: HOSPADM

## 2021-10-20 RX ORDER — PHENOBARBITAL 32.4 MG/1
129.6 TABLET ORAL NIGHTLY
Status: DISCONTINUED | OUTPATIENT
Start: 2021-10-20 | End: 2021-10-22 | Stop reason: HOSPADM

## 2021-10-20 RX ORDER — SODIUM CHLORIDE 0.9 % (FLUSH) 0.9 %
5-40 SYRINGE (ML) INJECTION PRN
Status: DISCONTINUED | OUTPATIENT
Start: 2021-10-20 | End: 2021-10-22 | Stop reason: HOSPADM

## 2021-10-20 RX ORDER — PHENYTOIN SODIUM 100 MG/1
400 CAPSULE, EXTENDED RELEASE ORAL NIGHTLY
Status: DISCONTINUED | OUTPATIENT
Start: 2021-10-20 | End: 2021-10-22 | Stop reason: HOSPADM

## 2021-10-20 RX ORDER — ONDANSETRON 4 MG/1
4 TABLET, ORALLY DISINTEGRATING ORAL EVERY 8 HOURS PRN
Status: DISCONTINUED | OUTPATIENT
Start: 2021-10-20 | End: 2021-10-22 | Stop reason: HOSPADM

## 2021-10-20 RX ORDER — ACETAMINOPHEN 650 MG/1
650 SUPPOSITORY RECTAL EVERY 6 HOURS PRN
Status: DISCONTINUED | OUTPATIENT
Start: 2021-10-20 | End: 2021-10-22 | Stop reason: HOSPADM

## 2021-10-20 RX ORDER — PHENYTOIN SODIUM 100 MG/1
400 CAPSULE, EXTENDED RELEASE ORAL DAILY
Status: DISCONTINUED | OUTPATIENT
Start: 2021-10-21 | End: 2021-10-20

## 2021-10-20 RX ORDER — PHENOBARBITAL 32.4 MG/1
129.6 TABLET ORAL DAILY
Status: DISCONTINUED | OUTPATIENT
Start: 2021-10-21 | End: 2021-10-20

## 2021-10-20 RX ORDER — ACETAMINOPHEN 325 MG/1
650 TABLET ORAL EVERY 6 HOURS PRN
Status: DISCONTINUED | OUTPATIENT
Start: 2021-10-20 | End: 2021-10-22 | Stop reason: HOSPADM

## 2021-10-20 RX ADMIN — SODIUM CHLORIDE 50 ML/HR: 3 INJECTION, SOLUTION INTRAVENOUS at 09:55

## 2021-10-20 RX ADMIN — PHENOBARBITAL 129.6 MG: 32.4 TABLET ORAL at 22:49

## 2021-10-20 RX ADMIN — RIVAROXABAN 20 MG: 20 TABLET, FILM COATED ORAL at 20:07

## 2021-10-20 RX ADMIN — GUAIFENESIN 200 MG: 200 SOLUTION ORAL at 00:48

## 2021-10-20 RX ADMIN — PHENYTOIN SODIUM 400 MG: 100 CAPSULE ORAL at 22:49

## 2021-10-20 RX ADMIN — SODIUM CHLORIDE: 9 INJECTION, SOLUTION INTRAVENOUS at 00:48

## 2021-10-20 RX ADMIN — MICONAZOLE NITRATE: 2 POWDER TOPICAL at 20:13

## 2021-10-20 RX ADMIN — SODIUM CHLORIDE: 9 INJECTION, SOLUTION INTRAVENOUS at 21:03

## 2021-10-20 ASSESSMENT — ENCOUNTER SYMPTOMS
CHEST TIGHTNESS: 0
TROUBLE SWALLOWING: 0
NAUSEA: 0
SHORTNESS OF BREATH: 0
PHOTOPHOBIA: 0
VOMITING: 0
DIARRHEA: 0
COUGH: 0
ALLERGIC/IMMUNOLOGIC NEGATIVE: 1

## 2021-10-20 NOTE — CARE COORDINATION
[facility] [place] Case Management Initial Discharge Assessment    Met with Patient to discuss discharge plan. PCP: Vaibhav Chew MD                                Date of Last Visit: 1-2 weeks ago    If no PCP, list provided? N/A    Discharge Planning    Living Arrangements: independently at home    Who do you live with? Alone    Who helps you with your care:  self or family    If lives at home:     Do you have any barriers navigating in your home? yes - Pt is obese with weakness and dizziness    Patient can perform ADL? Yes    Current Services (outpatient and in home) :  None    Dialysis: No    Is transportation available to get to your appointments? Yes  - Pt usually drives or her family helps    DME Equipment:  yes - walker,cane    Respiratory equipment: CPAP without O2 - Uses occ. Respiratory provider:  yes - [id number]:  yes - [facility]    Consult with Medication Assistance Program?  No      Patient agreeable to [facility]? Yes, Company Requested list will likely choose [facility]    Patient agreeable to SNF/Rehab? Yes, Company If needed and list given     Other discharge needs identified? N/A    Does Patient Have a High-Risk for Readmission Diagnosis (CHF, PN, MI, COPD)? No    Initial Discharge Plan? (Note: please see concurrent daily documentation for any updates after initial note). Met with patient and she is from home alone but agrees to having PT/OT eval and tx to help determine any dc needs. I gave her [facility] and SNF list . She has not ever needed in past . She has gone to outpatient PT at [facility].      Readmission Risk              Risk of Unplanned Readmission:  15         Electronically signed by Cherelle Limon on 10/20/2021 at 10:17 AM

## 2021-10-20 NOTE — PROGRESS NOTES
Physical Therapy Med Surg Initial Assessment  Facility/Department: 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 ([address]     Carotid stenosis, bilateral     Idiopathic generalized epilepsy ([address]     Generalized convulsive epilepsy ([address] 2015    Endometrial cancer ([address] 2015    Extreme obesity 2014    HLD (hyperlipidemia) 2014    Occipital infarction ([address] 2014    Family history of premature CAD 2014    Cerebral artery occlusion with cerebral infarction ([address] 2014        Past Medical History:   Diagnosis Date    Atrial fibrillation ([address] 2014    Carotid stenosis, bilateral     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 No pertinent surgical history. Chart Reviewed: Yes  Family / Caregiver Present: No  General Comment  Comments: Pt arriving from transport in [facility]. Agreeable to PT evaluation    Restrictions:  Restrictions/Precautions: Fall Risk     SUBJECTIVE: Subjective: \"I'm getting better. \"    Pain  Pre Treatment Pain Screening  Comments / Details: denies    Post Treatment Pain Screening:   Pain Assessment  Pain Assessment:  (denies)    Prior Level of Function:  Social/Functional History  Lives With: Alone  Type of Home: Apartment  Home Layout: Two level, Bed/Bath upstairs, 1/2 bath on main level  Home Access: Stairs to enter without rails  Entrance Stairs - Number of Steps: 1  Bathroom Shower/Tub: Tub only, Walk-in shower  Bathroom Equipment: Grab bars in shower, Shower chair, Hand-held shower  Home Equipment: [employer]  ADL Assistance: Independent  Homemaking Assistance: Independent  Ambulation Assistance: Independent  Transfer Assistance: Independent  Active : Yes    OBJECTIVE:   Vision Exceptions: Wears glasses at all times  Hearing Exceptions: Hard of hearing/hearing concerns    Cognition:  Overall Orientation Status: Within Functional Limits  Follows Commands: Within Functional Limits    Observation/Palpation  Observation: No acute distress noted. Pleasant and motivated. ROM:  RLE PROM: WFL  RLE General PROM: limited by excess soft tissue approximation  LLE PROM: WFL  LLE General PROM: limited by excess soft tissue approximation    Strength:  Strength RLE  Strength RLE: WFL  Strength LLE  Strength LLE: WFL    Neuro:  Balance  Sitting - Static: Good  Sitting - Dynamic: Good  Standing - Static: Fair  Standing - Dynamic: Fair  Comments: Impaired proactive righting reactions and safety awareness     Motor Control  Gross Motor?: WFL  Sensation  Overall Sensation Status: WFL    Bed mobility  Comment: NT - pt wishing to sit up in chair    Transfers  Sit to Stand: Contact guard assistance  Stand to sit: Contact guard assistance  Bed to Chair: Contact guard assistance  Comment: Verbal cues for hand placement and safe sequencing. Pt with impaired safety awareness and quick mobility, sitting without reaching back. Ambulation  Ambulation?: Yes  Ambulation 1  Surface: level tile  Device: Rolling Walker  Assistance: Contact guard assistance;Minimal assistance  Quality of Gait: Assist for safe management of [employer]. Pt instructed in proper alignment and approximation within [employer]. Shuffling steps with mild FF.   Distance: 10ft Activity Tolerance  Activity Tolerance: Patient Tolerated treatment well          PT Education  PT Education: Goals;PT Role;Plan of Care    ASSESSMENT:   Body structures, Functions, Activity limitations: Decreased functional mobility ; Decreased safe awareness;Decreased balance;Decreased coordination;Decreased strength;Decreased ADL status; Decreased endurance  Decision Making: Medium Complexity  History: High  Exam: Med  Clinical Presentation: Med    Prognosis: Good  Barriers to Learning: None    DISCHARGE RECOMMENDATIONS:  Discharge Recommendations: Continue to assess pending progress, Patient would benefit from continued therapy after discharge    Assessment: Continued PT indicated to progress mobility and facilitate DC at highest level of indep and safety. May benefit from therapy stay prior to DC home d/t increased falls risk. REQUIRES PT FOLLOW UP: Yes      PLAN OF CARE:  Plan  Times per week: 3-6  Current Treatment Recommendations: Strengthening, Balance Training, Functional Mobility Training, Transfer Training, Endurance Training, Gait Training, Stair training, Neuromuscular Re-education, Home Exercise Program, Safety Education & Training, Patient/Caregiver Education & Training, Equipment Evaluation, Education, & procurement  Safety Devices  Type of devices: All fall risk precautions in place    Goals:  Long term goals  Long term goal 1: Pt to complete bed mobility with indep  Long term goal 2: Pt to complete transfers with indep  Long term goal 3: Pt to ambulate 50-150ft with [employer] indep  Long term goal 4: Pt to manage 4 steps with CGA    AMPAC (6 CLICK) BASIC MOBILITY  AM-PAC Inpatient Mobility Raw Score : 16     Therapy Time:   Individual   Time In 1410   Time Out 1428   Minutes [address] [place] [address], 10/20/21 at 2:55 PM         Definitions for assistance levels  Independent = pt does not require any physical supervision or assistance from another person for activity completion.  Device may be needed.   Stand by assistance = pt requires verbal cues or instructions from another person, close to but not touching, to perform the activity  Minimal assistance= pt performs 75% or more of the activity; assistance is required to complete the activity  Moderate assistance= pt performs 50% of the activity; assistance is required to complete the activity  Maximal assistance = pt performs 25% of the activity; assistance is required to complete the activity  Dependent = pt requires total physical assistance to accomplish the task

## 2021-10-20 NOTE — H&P
Hospitalist Group   History and Physical      CHIEF COMPLAINT: Hyponatremia    History of Present Illness:  61 y.o. female with a history of A. fib on Xarelto, hyperlipidemia, seizure disorder on Dilantin, hypertension presents with hyponatremia. Patient has been feeling weak in the past week. She also is been complaining of some balance problem. She is taking Dilantin and her level was very high recently and the dose was decreased. Patient mentioned that she has not been eating well because she has been having upper respiratory symptoms including congestion and sinus drainage and headache. She also has been having a dry cough. Patient mentions that she has not been eating well because of her cold-like symptoms. She said that for the past week she has been forcing herself to drink more water. Patient also is on chlorthalidone. REVIEW OF SYSTEMS:  no fevers, chills, cp, sob, n/v, ha, vision/hearing changes, wt changes, hot/cold flashes, other open skin lesions, diarrhea, constipation, dysuria/hematuria unless noted in HPI. Complete ROS performed with the patient and is otherwise negative. 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 No pertinent surgical history. Medications Prior to Admission:    Prior to Admission medications    Medication Sig Start Date End Date Taking?  Authorizing Provider   phenytoin (DILANTIN) 100 MG ER capsule Take 6 capsules by mouth daily take 6 at night  Patient taking differently: Take 400 mg by mouth daily take 4 at night 10/6/21   MD Keara Meeks 20 MG TABS tablet Take 20 mg by mouth daily  5/1/20   Historical Provider, MD   MITIGARE 0.6 MG capsule TK ONE C PO BID PRN 10/26/19   Historical Provider, MD   chlorthalidone (HYGROTON) 25 MG tablet Take 25 mg by mouth daily  9/30/19   Historical Provider, MD   irbesartan (AVAPRO) 150 MG tablet Take 150 mg by mouth daily  9/30/19   Historical Provider, CELSO Guy Apply  topically. 11/24/14   Historical Provider, MD   PHENobarbital (LUMINAL) 64.8 MG tablet Take 129.6 mg by mouth daily. 10/13/14   Historical Provider, MD       Allergies:    Patient has no known allergies. Social History:    reports that she has never smoked. She has never used smokeless tobacco. She reports that she does not drink alcohol and does not use drugs.     Family History:       Family history unknown: Yes       PHYSICAL EXAM:  Vitals:  BP (!) 172/50   Pulse 55   Temp 98.7 °F (37.1 °C) (Oral)   Resp 16   Ht 5' 5\" (1.651 m)   Wt (!) 326 lb (147.9 kg)   SpO2 96%   BMI 54.25 kg/m²   General Appearance: alert and oriented to person, place and time, well developed and well- nourished, in no acute distress  Skin: warm and dry, no rash or erythema  Head: normocephalic and atraumatic  Eyes: pupils equal, round, and reactive to light, extraocular eye movements intact, conjunctivae normal  ENT: tympanic membrane, external ear and ear canal normal bilaterally, nose without deformity, nasal mucosa and turbinates normal without polyps  Neck: supple and non-tender without mass, no thyromegaly or thyroid nodules, no cervical lymphadenopathy  Pulmonary/Chest: clear to auscultation bilaterally- no wheezes   Cardiovascular: normal rate, regular rhythm, normal S1 and S2, no murmurs   Abdomen: soft, non-tender, non-distended, normal bowel sounds, no masses or organomegaly  Extremities: no cyanosis, clubbing or edema  Musculoskeletal: normal range of motion, no joint swelling, deformity or tenderness  Neurologic: reflexes normal and symmetric, no cranial nerve deficit, coordination and speech normal       LABS:  Recent Labs     10/18/21  0950 10/19/21  1303 10/19/21  2000   * 120* 117*   K 4.6 5.0* 4.5   CL 86* 84* 85*   CO2 24 23 24   BUN 24* 28* 30*   CREATININE 1.17* 1.23* 1.26*   GLUCOSE 97 92 97   CALCIUM 9.0 8.6 8.5       Recent Labs     10/19/21  2000   WBC 4.3*   RBC 3.59*   HGB 11.3*   HCT 32.5*   MCV 90.7   MCH 31.4*   MCHC 34.6   RDW 13.9          No results for input(s): POCGLU in the last 72 hours. CBC with Differential:    Lab Results   Component Value Date    WBC 4.3 10/19/2021    RBC 3.59 10/19/2021    HGB 11.3 10/19/2021    HCT 32.5 10/19/2021     10/19/2021    MCV 90.7 10/19/2021    MCH 31.4 10/19/2021    MCHC 34.6 10/19/2021    RDW 13.9 10/19/2021    LYMPHOPCT 26.5 10/19/2021    MONOPCT 18.7 10/19/2021    BASOPCT 0.8 10/19/2021    MONOSABS 0.8 10/19/2021    LYMPHSABS 1.1 10/19/2021    EOSABS 0.3 10/19/2021    BASOSABS 0.0 10/19/2021     CMP:    Lab Results   Component Value Date     10/19/2021    K 4.5 10/19/2021    CL 85 10/19/2021    CO2 24 10/19/2021    BUN 30 10/19/2021    CREATININE 1.26 10/19/2021    GFRAA 51.6 10/19/2021    LABGLOM 42.7 10/19/2021    GLUCOSE 97 10/19/2021    PROT 7.0 10/19/2021    LABALBU 4.0 10/19/2021    CALCIUM 8.5 10/19/2021    BILITOT <0.2 10/19/2021    ALKPHOS 106 10/19/2021    AST 22 10/19/2021    ALT 19 10/19/2021       Radiology: XR CHEST (2 VW)    Result Date: 10/19/2021  Exam: XR CHEST (2 VW)  CLINICAL HISTORY: R05.9 Cough ICD10 COMPARISON: None CHEST X-ray, 2 VIEWS FINDINGS: The cardiomediastinal silhouette is within normal limits. There are no infiltrates, consolidations or effusions. Bones of the thorax appear intact. No radiographic evidence of acute intrathoracic process. EKG: Sinus bradycardia with PACs    ASSESSMENT/ PLAN[de-identified]      Active Problems:    Hyponatremia  Resolved Problems:    * No resolved hospital problems. *      1. Hyponatremia   Sodium 117   On phenytoin and chlorthalidone   Both medication can cause hyponatremia   Recently she had high level of phenytoin but the dose was decreased-we will consult neurology to consider changing the medication   We will stop chlorthalidone   P.o. fluid restriction   Continue normal saline at 75 cc/h for now   Nephrology consult   Monitor sodium closely and check urine sodium level  2. Cold-like symptoms   Upper respiratory symptoms   Low oral intake   Will check respiratory viral panel   Hydration   Mucinex for cough   3. Hypertension   Will hold chlorthalidone due to hyponatremia   Hydralazine as needed  4. A. fib history   Continue Xarelto   Currently sinus bradycardia    Code Status: Full  DVT prophylaxis: Xarelto       Electronically signed by Miguel Draper MD on 10/19/2021 at 11:51 PM      NOTE: This report was transcribed using voice recognition software. Every effort was made to ensure accuracy; however, inadvertent computerized transcription errors may be present.

## 2021-10-20 NOTE — PROGRESS NOTES
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 ([address]     Carotid stenosis, bilateral     Idiopathic generalized epilepsy ([address]     Generalized convulsive epilepsy ([address] 2015    Endometrial cancer ([address] 2015    Extreme obesity 2014    HLD (hyperlipidemia) 2014    Occipital infarction ([address] 2014    Family history of premature CAD 2014    Cerebral artery occlusion with cerebral infarction ([address] 2014        Past Medical History:   Diagnosis Date    Atrial fibrillation ([address] 2014    Carotid stenosis, bilateral     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 No pertinent surgical history. Restrictions:Fall, IV        Safety Devices: Safety Devices  Safety Devices in place: Yes  Type of devices: All fall risk precautions in place   Initially in place: No    Subjective:Pt pleasant and cooperative.        Pain Reassessment: 0/10 pain reported          Prior Level of Function:  Social/Functional History  Lives With: Alone  Type of Home: Apartment  Home Layout: Two level, Bed/Bath upstairs, 1/2 bath on main level  Home Access: Stairs to enter without rails  Entrance Stairs - Number of Steps: Pt. does not perform task at an independent level but does not need physical assistance, requires verbal cues  Minimal, Moderate, Maximal Assistance = Pt. requires physical assistance (25%, 50%, 75% assist from helper) for task but is able to actively participate in task   Dependent = Pt. requires total assistance with task and is not able to actively participate with task completion     Functional Mobility:     Transfers  Sit to stand: Stand by assistance  Stand to sit: Stand by assistance    Bed Mobility       Seated and Standing Balance:  Balance  Sitting Balance: Supervision  Standing Balance: Stand by assistance    Functional Endurance:  Activity Tolerance  Activity Tolerance: Treatment limited secondary to medical complications (free text)    D/C Recommendations:  OT D/C RECOMMENDATIONS  REQUIRES OT FOLLOW UP: Yes    Equipment Recommendations:       OT Education:        OT Follow Up:  OT D/C RECOMMENDATIONS  REQUIRES OT FOLLOW UP: Yes       Assessment/Discharge Disposition:     Performance deficits / Impairments: Decreased functional mobility , Decreased balance, Decreased ADL status, Decreased endurance, Decreased strength, Decreased high-level IADLs  Prognosis: Good  Discharge Recommendations: Continue to assess pending progress  Decision Making: Medium Complexity  History: 7 complexities  Exam: 6 deficits  Assistance / Modification:  Mod A    Six Click Score    How much help for putting on and taking off regular lower body clothing?: A Lot  How much help for Bathing?: A Lot  How much help for Toileting?: None  How much help for putting on and taking off regular upper body clothing?: None  How much help for taking care of personal grooming?: None  How much help for eating meals?: None  AM-[facility] Inpatient Daily Activity Raw Score: 20  AM-PAC Inpatient ADL T-Scale Score : 42.03  ADL Inpatient CMS 0-100% Score: 38.32    Plan:  Plan  Times per week: 1-4x/wk  Current Treatment Recommendations: Strengthening, Balance Training, Functional Mobility Training, Neuromuscular Re-education, Endurance Training, Self-Care / ADL    Goals:   Patient will:    - Improve functional endurance to tolerate/complete 12-18 mins of ADL's  - Be independent in UB ADLs   - Be SBA in LB ADLs  - Be independent in ADL transfers without LOB  - Be independent in toileting tasks  - Improve bilateral UE strength and endurance to FAir in order to participate in self-care activities as projected. Patient Goal: Patient goals :  To get better      Discussed and agreed upon: Yes Comments:     Therapy Time:   OT Individual Minutes  Time In: 1410  Time Out: 1428  Minutes: 18    Eval: 18 minutes     Electronically signed by:    FUENTES Silva, GAILR/L  11/11/3912, 2:43 PM Electronically signed by IBRAHIMA Silva/L on 84/76/50 at 2:42 PM EDT

## 2021-10-20 NOTE — CONSULTS
[facility] Neurology Consult Note  Name: Harry Patrick  Age: 61 y.o. Gender: female  CodeStatus: Full Code  Allergies: No Known Allergies    Chief Complaint:Abnormal Lab    Primary Care Provider: Klever Ferrari MD  InpatientTreatment Team: Treatment Team: Attending Provider: Jose Castrejon MD; Consulting Physician: Marques Barclay MD; Consulting Physician: Liliam Steinberg MD; Patient Care Tech: Achates Power. Travis Looney; : Roque Duke; Registered Nurse: Bev Mayo RN; Utilization Reviewer: Ermias Bradshaw RN  Admission Date: 10/19/2021      Hospitals in [place]   Neurology consulted by Dr. Mir Ross hyponatremia in a patient that is on Dilantin. Patient was seen by primary care for sodium of 120. She is on combination of Dilantin and phenobarbital.  Patient has a history of postoperative seizures with generalized convulsive epilepsy. Patient has been on this medication since she was 17years old. Patient is followed outpatient by Dr. Mateus Reid for her seizures. On 10/8/2021, patient's phenytoin was lowered from 600 mg to 400 mg daily it was found to be supratherapeutic. Reports that her last seizure generalized in 1978. Patient states that she was experiencing extreme fatigue and feeling of dizziness which is exacerbated by position changes. Patient's fatigue has improved since decreasing dose of phenytoin.     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)     Endometrial cancer ([facility] 3/16/2015    Family history of premature CAD 12/9/2014    Generalized convulsive epilepsy ([facility] 3/16/2015    Hyperlipidemia     Idiopathic generalized epilepsy ([facility]     Obesity     Occipital infarction ([facility] 12/9/2014       Family History   Family history unknown: Yes       History reviewed. No pertinent surgical history. Social History     Socioeconomic History    Marital status: Single     Spouse name: None    Number of children: None    Years of education: None    Highest education level: None   Occupational History    None   Tobacco Use    Smoking status: Never Smoker    Smokeless tobacco: Never Used   Substance and Sexual Activity    Alcohol use: No    Drug use: No    Sexual activity: None   Other Topics Concern    None   Social History Narrative    None     Social Determinants of Health     Financial Resource Strain:     Difficulty of Paying Living Expenses:    Food Insecurity:     Worried About Running Out of Food in the Last Year:     Ran Out of Food in the Last Year:    Transportation Needs:     Lack of Transportation (Medical):     Lack of Transportation (Non-Medical):    Physical Activity:     Days of Exercise per Week:     Minutes of Exercise per Session:    Stress:     Feeling of Stress :    Social Connections:     Frequency of Communication with Friends and Family:     Frequency of Social Gatherings with Friends and Family:     Attends Mandaen Services:      Active Member of Clubs or Organizations:     Attends Club or Organization Meetings:     Marital Status:    Intimate Partner Violence:     Fear of Current or Ex-Partner:     Emotionally Abused:     Physically Abused:     Sexually Abused:         Vitals:    10/19/21 2334   BP: (!) 147/62   Pulse: 55   Resp:    Temp:    SpO2: 98%       Review of Systems   Constitutional: Positive for fatigue. Negative for chills and fever. HENT: Negative for congestion and trouble swallowing. Eyes: Negative for photophobia and visual disturbance. Respiratory: Negative for chest tightness and shortness of breath. Cardiovascular: Negative for chest pain. Gastrointestinal: Negative for diarrhea, nausea and vomiting. Endocrine: Negative. Genitourinary: Negative. Musculoskeletal: Negative for gait problem. Allergic/Immunologic: Negative. Neurological: Positive for dizziness. Negative for tremors, seizures, syncope, facial asymmetry, speech difficulty, weakness, light-headedness, numbness and headaches. Hematological: Negative. Psychiatric/Behavioral: Negative for hallucinations and self-injury. Physical Exam  Vitals and nursing note reviewed. Constitutional:       General: She is awake. Appearance: Normal appearance. She is obese. HENT:      Head: Normocephalic and atraumatic. Eyes:      General: Lids are normal.      Extraocular Movements: Extraocular movements intact. Conjunctiva/sclera: Conjunctivae normal.      Pupils: Pupils are equal, round, and reactive to light. Cardiovascular:      Rate and Rhythm: Normal rate and regular rhythm. Pulses: Normal pulses. Heart sounds: Normal heart sounds. Pulmonary:      Effort: Pulmonary effort is normal.      Breath sounds: Normal breath sounds. Musculoskeletal:         General: Normal range of motion. Neurological:      General: No focal deficit present. Mental Status: She is alert and oriented to person, place, and time. Cranial Nerves: No cranial nerve deficit. Sensory: No sensory deficit. Motor: No weakness. Coordination: Coordination normal.      Deep Tendon Reflexes: Reflexes normal.   Psychiatric:         Mood and Affect: Mood normal.         Behavior: Behavior normal. Behavior is cooperative. Thought Content:  Thought content normal.     Exam nonfocal      Medications:  Reviewed    Infusion Medications:    sodium chloride      sodium chloride 75 mL/hr at 10/20/21 0048    sodium chloride       Scheduled Medications:    rivaroxaban  20 mg Oral Daily    sodium chloride flush  5-40 mL IntraVENous 2 times per day    miconazole   Topical BID     PRN Meds: sodium chloride flush, sodium chloride, ondansetron **OR** ondansetron, polyethylene glycol, acetaminophen **OR** acetaminophen, guaiFENesin    Labs:   Recent Labs     10/19/21  2000 10/20/21  0804   WBC 4.3* 4.1*   HGB 11.3* 11.1*   HCT 32.5* 32.0*    242     Recent Labs     10/19/21  1303 10/19/21  2000 10/20/21  0804   * 117* 123*  122*   K 5.0* 4.5 4.6  4.7   CL 84* 85* 87*  87*   CO2 23 24 23  23   BUN 28* 30* 27*  26*   CREATININE 1.23* 1.26* 1.07*  1.01*   CALCIUM 8.6 8.5 8.2*  8.3*     Recent Labs     10/19/21  2000 10/20/21  0804   AST 22 19   ALT 19 17   BILITOT <0.2 <0.2   ALKPHOS 106 94     No results for input(s): INR in the last 72 hours. No results for input(s): Chandrika Dross in the last 72 hours. Urinalysis:   Lab Results   Component Value Date    NITRU Negative 10/19/2021    BLOODU Negative 10/19/2021    SPECGRAV 1.008 10/19/2021    GLUCOSEU Negative 10/19/2021       Radiology:   Most recent    EEG No valid procedures specified. MRI of Brain Results for orders placed during the hospital encounter of 11/18/14    MRI Brain W WO Contrast    Narrative  MRI BRAIN    REASON FOR EXAM  LOSS OF CENTRAL VISION. INCREASED \"PRESSURE\" AROUND  THE HEAD    COMPARISON  NONE AVAILABLE    TECHNIQUE  Routine unenhanced and postgadolinium enhanced  multisequence and multiplanar MRI scan of the brain was obtained. FINDINGS There is increased hyperintensity on the T1-weighted images  along the cortex of the right  middle temporal gyrus and adjacent right  occipital area. This is consistent with an area of laminar necrosis  which can be seen in subacute infarcts.  This region shows some  hyperintensity on the DWI weighted images but demonstrates relatively  isointense signal on the ADC maps. On the postgadolinium scans this  area shows diffuse enhancement. There is no associated mass effect. There is no hemorrhage. These findings are most consistent with a  subacute infarct at least 3weeks old. An area of infection is unlikely  given diffusion-weighted imaging  pattern and lack of mass effect. A  mass lesion or tumor is extremely unlikely given the pattern of  enhancement with lack of focal mass or mass effect. The basal cisterns, ventricles and sulci are normal.    There are bilateral patchy areas of high signal intensity in a  periventricular and subcortical distribution most consistent with  ischemic small vessel disease and aging. A more focal patch of the  deep white matter hyperintensity in the left periventricular parietal  area may be more acute. On the coronal T2-weighted images the flow void in the proximal right  PCA is not optimally visualized. While this may be a technical  limitation due to slice acquisition the possibility of some diminished  flow in that vessel is raised. A dedicated MRA of the brain might be  considered. Tiny retention cyst or polyp in the right maxillary sinus. Visualized sinuses and mastoids are otherwise unremarkable. Both globes are symmetric and normal in size and position. The  abductors are normal in course and caliber. The extraocular muscles  retrobulbar fat in ophthalmic veins appear normal. There is no soft  tissue mass    The craniocervical junction appears normal.    The soft tissues, fat and muscle planes at the skull base are symmetric  and normal.      IMPRESSION FINDINGS IN THE RIGHT TEMPORAL OCCIPITAL AREA ARE MOST  CONSISTENT WITH A SUBACUTE NONHEMORRHAGIC INFARCT AS DISCUSSED ABOVE. THE ORBITS AND THEIR CONTENTS APPEAR NORMAL.  QUESTIONABLE DIMINISHED  FLOW VOID IN THE PROXIMAL RIGHT POSTERIOR CEREBRAL ARTERY. Lindajo Graft By- Kathryn Marvin M.D. Released By- Kathryn Marvin M.D. Released Date Time- 11/18/14 1422  This document has been electronically signed. ------------------------------------------------------------------------------  No results found for this or any previous visit. MRA of the Head and Neck: No results found for this or any previous visit. No results found for this or any previous visit. Results for orders placed during the hospital encounter of 12/26/14    MRA Head WO Contrast    Narrative  EXAMINATION MRA of the head without contrast    CLINICAL HISTORY Cerebral infarction, headaches, which are    COMPARISONS MRI of the brain dated 11/18/14    TECHNIQUE 3-D time-of-flight MRA imaging of the head was performed  without the use of contrast.  Motion artifact degrades the images. FINDINGS The vertebrobasilar junction, basilar artery, and basilar tip  appear unremarkable. There is occlusion of the right posterior  cerebral artery at the  P1 level. Left posterior cerebral artery  demonstrates focal high-grade stenosis at the mid portion of the P2  segment but remains patent. Motion artifact limits evaluation of the  supraclinoid portions of the internal carotid arteries. The remaining  more proximal visible portions appear patent without significant  stenosis. Motion artifact also limits evaluation of the M1 and  proximal portions of the M2 segments of the middle cerebral arteries  bilaterally, as well as the proximal portions of the anterior cerebral  arteries. However, on the prior MRI of 11/18/14, normal flow voids are  identified within these regions. Flow is noted within the distal  aspects of the middle and anterior cerebral arteries bilaterally. IMPRESSION OCCLUSION OF THE RIGHT POSTERIOR CEREBRAL ARTERY AT THE P1  SEGMENT. FOCAL HIGH-GRADE STENOSIS OF THE LEFT P2 SEGMENT. ALTHOUGH  MOTION ARTIFACT LIMITS EVALUATION OF THE PROXIMAL PORTIONS OF THE  ANTERIOR AND MIDDLE CEREBRAL ARTERIES, THE DEMONSTRATE NORMAL FLOW  VOIDS ON THE PRIOR MRI OF 11/18/14. BR[employer]GET [employer] has been communicated to ÁNGEL FUENTES MD  via the [employer]  Critical Result system on 12/26/2014 905  AM, Message ID [id number]. Mine Medina Jihan Grajeda M.D. Released Jihan Grajeda M.D. Released Date Time- 12/26/14 0908  This document has been electronically signed. ------------------------------------------------------------------------------                            CT of the Head: No results found for this or any previous visit. No results found for this or any previous visit. No results found for this or any previous visit. Carotid duplex: No results found for this or any previous visit. No results found for this or any previous visit. Results for orders placed during the hospital encounter of 12/28/17    US CAROTID ARTERY BILATERAL    Narrative  EXAMINATION: US CAROTID ARTERY BILATERAL:    DATE AND TIME: 12/28/2017 at 11:29 AM.    CLINICAL HISTORY: BILATERAL CAROTID ARTERY STENOSIS. COMPARISON: None. TECHNIQUE: Carotid duplex sonograms which include gray scale and color flow evaluation are complimented with spectral waveform analysis. Please refer to chart below for specific carotid velocity measurements. Validated velocity measurements with  angiographic measurements, velocity criteria are extrapolated from diameter data as defined by the Society of Radiologist in [address] [place] [address] Radiology 2003; 699;769-338\". FINDINGS:    No significant plaque formation is seen. RIGHT ICA: < 50%. No significant plaque formation is seen. LEFT ICA: <50%. Antegrade flow in both vertebral arteries.       ARTERIAL BLOOD FLOW VELOCITY    RIGHT PS    Prox CCA    91 cm/s  Mid CCA     81 cm/s  Dist CCA    73 cm/s  Prox ICA    78 cm/s  Mid ICA phenytoin or phenobarbital.  That might related to the diuretic. She is asymptomatic. Her dizziness improved considerably since we decreased her phenytoin as an outpatient. Adam Reid MD, Rosemary Lopez, American Board of Psychiatry & Neurology  Board Certified in Vascular Neurology  Board Certified in Neuromuscular Medicine  Certified in Neurorehabilitation         Collaborating physicians: Dr Mateus Reid    Electronically signed by Adelina Dickson PA-C on 10/20/2021 at 9:30 AM

## 2021-10-20 NOTE — ACP (ADVANCE CARE PLANNING)
Advance Care Planning     Advance Care Planning Inpatient Note  [facility] Department    Today's Date: 10/20/2021  Unit: [facility]  4W MED SURG UNIT    Received request from family. Upon review of chart and communication with care team, patient's decision making abilities are not in question. . Patient was/were present in the room during visit. Goals of ACP Conversation:  Update Advanced Directives    Health Care Decision Makers:     No healthcare decision makers have been documented. Click here to complete [address] including selection of the Healthcare Decision Maker Relationship (ie \"Primary\")  Summary:  Completed New Documents    Advance Care Planning Documents (Patient Wishes):  Healthcare Power of /Advance Directive Appointment of Health Care Agent     Assessment:  Patient had completed AD documents years ago at [facility] in [place]. Patient and family wanted to complete new forms to update information. Interventions:  Assisted in the completion of documents according to patient's wishes at this time    Care Preferences Communicated:   No    Outcomes/Plan:  New advance directive completed.     Electronically signed by Mavis Costello [address] [place] [address] on 10/20/2021 at 1:08 PM

## 2021-10-20 NOTE — PROGRESS NOTES
Progress Note  Date:10/20/2021       Room:[place] 625305-01  Patient Name:Penny Suresh     YOB: 1955     Age:66 y.o. Subjective    Subjective:  Symptoms:  She reports malaise and weakness. No shortness of breath, cough, chest pain, headache, chest pressure, anorexia, diarrhea or anxiety. Diet:  No nausea or vomiting. Review of Systems   Respiratory: Negative for cough and shortness of breath. Cardiovascular: Negative for chest pain. Gastrointestinal: Negative for anorexia, diarrhea, nausea and vomiting. Neurological: Positive for weakness. Objective         Vitals Last 24 Hours:  TEMPERATURE:  Temp  Av.5 °F (36.9 °C)  Min: 98.2 °F (36.8 °C)  Max: 98.7 °F (37.1 °C)  RESPIRATIONS RANGE: Resp  Av  Min: 16  Max: 20  PULSE OXIMETRY RANGE: SpO2  Av.8 %  Min: 94 %  Max: 100 %  PULSE RANGE: Pulse  Av.8  Min: 55  Max: 62  BLOOD PRESSURE RANGE: Systolic (13VIT), :070 , Min:142 , URB:590   ; Diastolic (03MUL), JNF:44, Min:50, Max:74    I/O (24Hr): Intake/Output Summary (Last 24 hours) at 10/20/2021 1326  Last data filed at 10/20/2021 0430  Gross per 24 hour   Intake 413 ml   Output --   Net 413 ml     Objective:  General Appearance:  Comfortable, well-appearing and in no acute distress. Vital signs: (most recent): Blood pressure (!) 142/57, pulse 55, temperature 98.2 °F (36.8 °C), temperature source Oral, resp. rate 18, height 5' 5\" (1.651 m), weight (!) 326 lb (147.9 kg), SpO2 97 %. HEENT: Normal HEENT exam.    Lungs:  Normal effort. Heart: Normal rate. Regular rhythm. S1 normal.    Abdomen: Abdomen is soft. Bowel sounds are normal.   There is no epigastric area or suprapubic area tenderness. Pulses: Distal pulses are intact. Neurological: Patient is alert. Pupils:  Pupils are equal, round, and reactive to light. Skin:  Warm and dry.       Labs/Imaging/Diagnostics    Labs:  CBC:  Recent Labs     10/19/21  2000 10/20/21  0804   WBC 4.3* 4.1*   RBC 3.59* 3.45*   HGB 11.3* 11.1*   HCT 32.5* 32.0*   MCV 90.7 92.8   RDW 13.9 14.1    242     CHEMISTRIES:  Recent Labs     10/19/21  2000 10/20/21  0804 10/20/21  1159   * 123*  122* 120*   K 4.5 4.6  4.7 4.6   CL 85* 87*  87* 84*   CO2 24 23  23 24   BUN 30* 27*  26* 27*   CREATININE 1.26* 1.07*  1.01* 1.10*   GLUCOSE 97 97  94 95   MG  --  1.5*  --      PT/INR:No results for input(s): PROTIME, INR in the last 72 hours. APTT:No results for input(s): APTT in the last 72 hours. LIVER PROFILE:  Recent Labs     10/19/21  2000 10/20/21  0804   AST 22 19   ALT 19 17   BILITOT <0.2 <0.2   ALKPHOS 106 94       Imaging Last 24 Hours:  XR CHEST (2 VW)    Result Date: 10/19/2021  Exam: XR CHEST (2 VW)  CLINICAL HISTORY: R05.9 Cough ICD10 COMPARISON: None CHEST X-ray, 2 VIEWS FINDINGS: The cardiomediastinal silhouette is within normal limits. There are no infiltrates, consolidations or effusions. Bones of the thorax appear intact. No radiographic evidence of acute intrathoracic process. Assessment//Plan           Hospital Problems         Last Modified POA    Hyponatremia 10/19/2021 Yes        Assessment & Plan  10/20: Most likely from chlorthalidone causing her to have hyponatremia. PT OT ordered. Anticipate discharge to 2 to 3 days based on patient clinical course. Spoke with sister at bedside. No overnight events. Denies any complaints. Patient lives by herself. Fall risk.   Electronically signed by Lowell Villarreal MD on 10/20/21 at 1:26 PM EDT

## 2021-10-20 NOTE — PROGRESS NOTES
Pt admission completed. Vitals stable. Denies pain, SOB, nausea & vomiting. 4 eyes skin assessment completed with Evy ROSADO. Pt has generalized bruising all over due to falls at home. Pt has one big blister on left 2nd big toe and small ones on three toes on her right foot. Redness under abdominal fold. Pt states she uses a walker at home. Pt has no other requests at this time. Fall precautions in place, yellow socks and wristband applied. Oriented to room and call light. Will continue to monitor.      Electronically signed by Anupam Welsh RN on 10/19/2021 at 11:45 PM

## 2021-10-20 NOTE — ED PROVIDER NOTES
[address] [facility] ED  EMERGENCY MEDICINE     Pt Name: Randa Owen  MRN: [id number]  [place] 1955  Date of evaluation: 10/19/2021  PCP:    Micha Blum MD  Provider: Cheri Alfaro DO    CHIEF COMPLAINT       Chief Complaint   Patient presents with    Abnormal Lab       HISTORY OF PRESENT ILLNESS    HPI     72-year-old female with history of obesity, hyperlipidemia, epilepsy, history of CVA, GAYLA presents to the emergency department with concern for low sodium. Patient was sent over by her primary care doctor because she had a sodium of 120 on repeat blood draws today. Patient states she is been having intermittent dizziness over the past couple of weeks which have somewhat resolved over the past week but states that she is still getting her sodium checked as it has been consistently dropping. She denies any numbness or tingling in her extremities. She denies any chest pain or shortness of breath. Denies any headache. She has had a cough and upper respiratory illness over the past couple of days. She denies any shortness of breath though with that. Denies any nausea, vomiting, diarrhea. Patient does take chlorthalidone as well as phenobarbital and phenytoin. She has been taking these for quite some time now. Triage notes and Nursing notes were reviewed by myself. Any discrepancies are addressed above. PAST MEDICAL HISTORY     Past Medical History:   Diagnosis Date    Atrial fibrillation ([address] 12/9/2014    Carotid stenosis, bilateral     Cerebral infarction due to embolism (HCC)     Endometrial cancer ([address] 3/16/2015    Family history of premature CAD 12/9/2014    Generalized convulsive epilepsy ([address] 3/16/2015    Hyperlipidemia     Idiopathic generalized epilepsy ([address]     Obesity     Occipital infarction ([address] 12/9/2014       SURGICAL HISTORY     History reviewed. No pertinent surgical history.     CURRENT MEDICATIONS       Current Discharge Medication List CONTINUE these medications which have NOT CHANGED    Details   phenytoin (DILANTIN) 100 MG ER capsule Take 6 capsules by mouth daily take 6 at night  Qty: 180 capsule, Refills: 3      XARELTO 20 MG TABS tablet Take 20 mg by mouth daily       MITIGARE 0.6 MG capsule TK ONE C PO BID PRN  Refills: 1      chlorthalidone (HYGROTON) 25 MG tablet Take 25 mg by mouth daily       irbesartan (AVAPRO) 150 MG tablet Take 150 mg by mouth daily       NYSTATIN, TOPICAL, POWD Apply  topically. PHENobarbital (LUMINAL) 64.8 MG tablet Take 129.6 mg by mouth daily. ALLERGIES     No Known Allergies    FAMILY HISTORY       Family History   Family history unknown: Yes        SOCIAL HISTORY       Social History     Socioeconomic History    Marital status: Single     Spouse name: None    Number of children: None    Years of education: None    Highest education level: None   Occupational History    None   Tobacco Use    Smoking status: Never Smoker    Smokeless tobacco: Never Used   Substance and Sexual Activity    Alcohol use: No    Drug use: No    Sexual activity: None   Other Topics Concern    None   Social History Narrative    None     Social Determinants of Health     Financial Resource Strain:     Difficulty of Paying Living Expenses:    Food Insecurity:     Worried About Running Out of Food in the Last Year:     Ran Out of Food in the Last Year:    Transportation Needs:     Lack of Transportation (Medical):      Lack of Transportation (Non-Medical):    Physical Activity:     Days of Exercise per Week:     Minutes of Exercise per Session:    Stress:     Feeling of Stress :    Social Connections:     Frequency of Communication with Friends and Family:     Frequency of Social Gatherings with Friends and Family:     Attends Restoration Services:     Active Member of Clubs or Organizations:     Attends Club or Organization Meetings:     Marital Status:    Intimate Partner Violence:     Fear of Current or Ex-Partner:     Emotionally Abused:     Physically Abused:     Sexually Abused:        REVIEW OF SYSTEMS     Review of Systems   Neurological: Positive for dizziness, weakness and light-headedness. Except as noted above the remainder of the review of systems was reviewed and is negative. SCREENINGS                        PHYSICAL EXAM    (up to 7 for level 4, 8 or more for level 5)     ED Triage Vitals [10/19/21 1922]   BP Temp Temp Source Pulse Resp SpO2 Height Weight   (!) 159/74 98.7 °F (37.1 °C) Oral 62 18 97 % 5' 5\" (1.651 m) (!) 326 lb (147.9 kg)       Physical Exam  Constitutional:       General: She is not in acute distress. Appearance: Normal appearance. She is normal weight. She is not ill-appearing. HENT:      Head: Normocephalic and atraumatic. Right Ear: External ear normal.      Left Ear: External ear normal.      Nose: Nose normal. No congestion or rhinorrhea. Mouth/Throat:      Mouth: Mucous membranes are moist.      Pharynx: No oropharyngeal exudate or posterior oropharyngeal erythema. Eyes:      General:         Right eye: No discharge. Left eye: No discharge. Extraocular Movements: Extraocular movements intact. Pupils: Pupils are equal, round, and reactive to light. Cardiovascular:      Rate and Rhythm: Normal rate and regular rhythm. Pulses: Normal pulses. Pulmonary:      Effort: Pulmonary effort is normal.      Breath sounds: Wheezing present. Abdominal:      General: Abdomen is flat. Bowel sounds are normal. There is no distension. Tenderness: There is no abdominal tenderness. There is no right CVA tenderness, left CVA tenderness, guarding or rebound. Musculoskeletal:         General: No swelling or tenderness. Normal range of motion. Cervical back: Normal range of motion and neck supple. Right lower leg: No edema. Left lower leg: No edema. Skin:     General: Skin is warm and dry.       Capillary Refill: Capillary refill takes less than 2 seconds. Neurological:      General: No focal deficit present. Mental Status: She is alert. Psychiatric:         Mood and Affect: Mood normal.           DIAGNOSTIC RESULTS     EKG:(none if blank)  All EKGs are interpreted by the Emergency Department Physician who either signs or Co-signs this chart in the absence of a cardiologist.    EKG performed at 2015 shows sinus bradycardia with PACs. Heart rate of 55 bpm.  There is right ventricular hypertrophy seen. QTC of 415 ms. No significant ST-T wave abnormalities noted. Right bundle branch block. RADIOLOGY: (none if blank)   I directly visualized the following images and reviewed the radiologist interpretations. Interpretation per the Radiologist below, if available at the time of this note:  XR CHEST PORTABLE    (Results Pending)   X-ray performed earlier shows no acute cardiopulmonary process. LABS:  Labs Reviewed   COMPREHENSIVE METABOLIC PANEL - Abnormal; Notable for the following components:       Result Value    Sodium 117 (*)     Chloride 85 (*)     Anion Gap 8 (*)     BUN 30 (*)     CREATININE 1.26 (*)     GFR Non- 42.7 (*)     GFR  51.6 (*)     All other components within normal limits    Narrative:     CALL  [place]  [facility] tel. [phone],  Na results called to and read back by Munira HICKS, 10/19/2021 21:25, by Ana Guevara   CBC WITH AUTO DIFFERENTIAL - Abnormal; Notable for the following components:    WBC 4.3 (*)     RBC 3.59 (*)     Hemoglobin 11.3 (*)     Hematocrit 32.5 (*)     MCH 31.4 (*)     All other components within normal limits   URINE RT REFLEX TO CULTURE   OSMOLALITY, URINE   SODIUM, URINE, RANDOM   CREATININE, RANDOM URINE       All other labs were within normal range or not returned as of this dictation. Please note, any cultures that may have been sent were not resulted at the time of this patient visit.     EMERGENCY DEPARTMENT COURSE and Medical Decision Making: Vitals:    Vitals:    10/19/21 2030 10/19/21 2100 10/19/21 2130 10/19/21 2200   BP: (!) 155/53 (!) 150/50 (!) 150/60 (!) 172/50   Pulse: 55 57 58 55   Resp: 20 18 18 16   Temp:       TempSrc:       SpO2: 94% 97% 95% 96%   Weight:       Height:           PROCEDURES: (None if blank)  Procedures       MDM. Patient's repeat sodium today was 117. This does seem chronic to me but with the patient's intermittent dizziness over the past couple of days, patient will be admitted into the hospital.  She is on chlorthalidone which could be part of the issue. After doing some research as well, patient is on Dilantin which can also cause hyponatremia. Strict return precautions and follow up instructions were discussed with the patient with which the patient agrees    ED Medications administered this visit:  Medications - No data to display      FINAL IMPRESSION      1. Hyponatremia          DISPOSITION/PLAN   DISPOSITION Admitted 10/19/2021 10:11:13 PM      PATIENT REFERRED TO:  No follow-up provider specified.     DISCHARGE MEDICATIONS:  Current Discharge Medication List                 David Partida DO (electronically signed)  Attending Physician, Emergency Department         David Partida DO  10/19/21 [id number]

## 2021-10-20 NOTE — CONSULTS
[address]                      [address], [address] [facility]                                  CONSULTATION    PATIENT NAME: Mateo Elizalde                  :        1955  MED REC NO:   [id number]                            ROOM:       [id number]  ACCOUNT NO:   [de-identified]                           ADMIT DATE: 10/19/2021  PROVIDER:     Winnie Jaimes DO    CONSULT DATE:  10/20/2021    RENAL CONSULT    HISTORY OF PRESENT ILLNESS:  A 37-year-old morbidly obese female  admitted to the hospital from home. The patient is on Dilantin but has  not had a seizure since . She has a history of atrial fibrillation  and is on Xarelto, but presently is in sinus rhythm. She was admitted  for increasing weakness and had difficulty with getting around her home. Because of this she was brought to the hospital and noted sodium of 120  mEq. She does take chlorthalidone for her blood pressure. She appears  to be in no distress at this time. She has not been having any nausea,  vomiting or diarrhea. She does not take excessive fluid intake per  history. She appears euvolemic at this time. PAST MEDICAL HISTORY:  History of seizures, history of hypertension,  occipital infarct in , obstructive sleep apnea, history of seizures,  morbid obesity. PAST SURGICAL HISTORY:  Negative. FAMILY HISTORY:  Negative. HABITS:  No smoking. No alcohol use. No opioids or nonsteroidals. MEDICATIONS:  At time of her admission; vitamin D, Dilantin, Xarelto,  Avapro, Nystatin powder, phenobarbital tablets. ALLERGIES TO MEDICATIONS:  None. REVIEW OF SYSTEMS:  Negative. PHYSICAL EXAMINATION:  VITAL SIGNS:  Height 5 feet 5 inches, 326 pounds. Blood pressure  147/60, heart rate 60, respirations 16, afebrile. HEENT:  Normocephalic. Pupils are reactive to light. Sclerae are  clear. Throat shows no exudates. Tongue is midline. NECK:  Supple.   No JVD, bruits or adenopathy. CHEST:  Lungs are clear. Decreased due to her size. CARDIOVASCULAR:  Heart is regular without ectopy, 1/6 systolic murmur is  present. ABDOMEN:  Grossly obese. No guarding or rigidity. Difficult evaluation  due to her size. EXTREMITIES:  Show no edema. Skin is warm and dry. IMPRESSION:  1. Hyponatremia on admission with sodium of 122 mEq, down to 117 mEq,  possibly related to diuretic therapy, chlorthalidone. 2.  History of seizures, last 1978  3. History of occipital CVA in 2014. 4.  Morbid obesity. 5.  Organic heart disease. 6.  Atrial fibrillation, sinus rhythm now. PLAN:  Restrict fluids. Avoid Dyazide type diuretics. We will give 3%  sodium chloride to assist in improving her sodium level and eliminating  possibly of seizure activity. We will follow [facility].         Doneta Cheadle, DO    D: 10/20/2021 9:11:58       T: 10/20/2021 9:18:18     [id number]  Job#: [id number]     Doc#: [id number]    CC:

## 2021-10-21 ENCOUNTER — APPOINTMENT (OUTPATIENT)
Dept: GENERAL RADIOLOGY | Age: 66
DRG: 641 | End: 2021-10-21
Payer: COMMERCIAL

## 2021-10-21 LAB
ALBUMIN SERPL-MCNC: 3.7 G/DL (ref 3.5–4.6)
ALP BLD-CCNC: 95 U/L (ref 40–130)
ALT SERPL-CCNC: 15 U/L (ref 0–33)
ANION GAP SERPL CALCULATED.3IONS-SCNC: 11 MEQ/L (ref 9–15)
ANION GAP SERPL CALCULATED.3IONS-SCNC: 12 MEQ/L (ref 9–15)
ANION GAP SERPL CALCULATED.3IONS-SCNC: 13 MEQ/L (ref 9–15)
AST SERPL-CCNC: 17 U/L (ref 0–35)
BASOPHILS ABSOLUTE: 0 K/UL (ref 0–0.2)
BASOPHILS RELATIVE PERCENT: 0.4 %
BILIRUB SERPL-MCNC: <0.2 MG/DL (ref 0.2–0.7)
BUN BLDV-MCNC: 25 MG/DL (ref 8–23)
BUN BLDV-MCNC: 26 MG/DL (ref 8–23)
BUN BLDV-MCNC: 28 MG/DL (ref 8–23)
CALCIUM SERPL-MCNC: 8.1 MG/DL (ref 8.5–9.9)
CALCIUM SERPL-MCNC: 8.4 MG/DL (ref 8.5–9.9)
CALCIUM SERPL-MCNC: 8.5 MG/DL (ref 8.5–9.9)
CALCIUM SERPL-MCNC: 8.6 MG/DL (ref 8.5–9.9)
CALCIUM SERPL-MCNC: 9 MG/DL (ref 8.5–9.9)
CHLORIDE BLD-SCNC: 90 MEQ/L (ref 95–107)
CHLORIDE BLD-SCNC: 91 MEQ/L (ref 95–107)
CHLORIDE BLD-SCNC: 91 MEQ/L (ref 95–107)
CHLORIDE BLD-SCNC: 92 MEQ/L (ref 95–107)
CHLORIDE BLD-SCNC: 94 MEQ/L (ref 95–107)
CO2: 22 MEQ/L (ref 20–31)
CO2: 23 MEQ/L (ref 20–31)
CO2: 24 MEQ/L (ref 20–31)
CO2: 24 MEQ/L (ref 20–31)
CO2: 25 MEQ/L (ref 20–31)
CREAT SERPL-MCNC: 1.05 MG/DL (ref 0.5–0.9)
CREAT SERPL-MCNC: 1.11 MG/DL (ref 0.5–0.9)
CREAT SERPL-MCNC: 1.13 MG/DL (ref 0.5–0.9)
CREAT SERPL-MCNC: 1.16 MG/DL (ref 0.5–0.9)
CREAT SERPL-MCNC: 1.16 MG/DL (ref 0.5–0.9)
EOSINOPHILS ABSOLUTE: 0.1 K/UL (ref 0–0.7)
EOSINOPHILS RELATIVE PERCENT: 4 %
GFR AFRICAN AMERICAN: 56.8
GFR AFRICAN AMERICAN: 56.8
GFR AFRICAN AMERICAN: 58.5
GFR AFRICAN AMERICAN: 59.7
GFR AFRICAN AMERICAN: >60
GFR NON-AFRICAN AMERICAN: 46.9
GFR NON-AFRICAN AMERICAN: 46.9
GFR NON-AFRICAN AMERICAN: 48.4
GFR NON-AFRICAN AMERICAN: 49.4
GFR NON-AFRICAN AMERICAN: 52.6
GLOBULIN: 2.5 G/DL (ref 2.3–3.5)
GLUCOSE BLD-MCNC: 104 MG/DL (ref 70–99)
GLUCOSE BLD-MCNC: 94 MG/DL (ref 70–99)
GLUCOSE BLD-MCNC: 96 MG/DL (ref 70–99)
GLUCOSE BLD-MCNC: 96 MG/DL (ref 70–99)
GLUCOSE BLD-MCNC: 98 MG/DL (ref 70–99)
HCT VFR BLD CALC: 33 % (ref 37–47)
HEMOGLOBIN: 11.2 G/DL (ref 12–16)
LYMPHOCYTES ABSOLUTE: 1.1 K/UL (ref 1–4.8)
LYMPHOCYTES RELATIVE PERCENT: 31 %
MAGNESIUM: 1.5 MG/DL (ref 1.7–2.4)
MCH RBC QN AUTO: 31.3 PG (ref 27–31.3)
MCHC RBC AUTO-ENTMCNC: 33.9 % (ref 33–37)
MCV RBC AUTO: 92.4 FL (ref 82–100)
MONOCYTES ABSOLUTE: 0.1 K/UL (ref 0.2–0.8)
MONOCYTES RELATIVE PERCENT: 2.9 %
NEUTROPHILS ABSOLUTE: 2.1 K/UL (ref 1.4–6.5)
NEUTROPHILS RELATIVE PERCENT: 62 %
PDW BLD-RTO: 14.1 % (ref 11.5–14.5)
PLATELET # BLD: 279 K/UL (ref 130–400)
PLATELET SLIDE REVIEW: ADEQUATE
POTASSIUM SERPL-SCNC: 4.5 MEQ/L (ref 3.4–4.9)
POTASSIUM SERPL-SCNC: 4.5 MEQ/L (ref 3.4–4.9)
POTASSIUM SERPL-SCNC: 4.6 MEQ/L (ref 3.4–4.9)
POTASSIUM SERPL-SCNC: 4.6 MEQ/L (ref 3.4–4.9)
POTASSIUM SERPL-SCNC: 5.1 MEQ/L (ref 3.4–4.9)
RBC # BLD: 3.57 M/UL (ref 4.2–5.4)
SODIUM BLD-SCNC: 126 MEQ/L (ref 135–144)
SODIUM BLD-SCNC: 126 MEQ/L (ref 135–144)
SODIUM BLD-SCNC: 127 MEQ/L (ref 135–144)
SODIUM BLD-SCNC: 128 MEQ/L (ref 135–144)
SODIUM BLD-SCNC: 129 MEQ/L (ref 135–144)
TOTAL PROTEIN: 6.2 G/DL (ref 6.3–8)
WBC # BLD: 3.4 K/UL (ref 4.8–10.8)

## 2021-10-21 PROCEDURE — 83735 ASSAY OF MAGNESIUM: CPT

## 2021-10-21 PROCEDURE — 6370000000 HC RX 637 (ALT 250 FOR IP)

## 2021-10-21 PROCEDURE — 2500000003 HC RX 250 WO HCPCS: Performed by: INTERNAL MEDICINE

## 2021-10-21 PROCEDURE — 6370000000 HC RX 637 (ALT 250 FOR IP): Performed by: INTERNAL MEDICINE

## 2021-10-21 PROCEDURE — 99212 OFFICE O/P EST SF 10 MIN: CPT

## 2021-10-21 PROCEDURE — 2580000003 HC RX 258: Performed by: INTERNAL MEDICINE

## 2021-10-21 PROCEDURE — 94640 AIRWAY INHALATION TREATMENT: CPT

## 2021-10-21 PROCEDURE — 6360000002 HC RX W HCPCS: Performed by: INTERNAL MEDICINE

## 2021-10-21 PROCEDURE — 80048 BASIC METABOLIC PNL TOTAL CA: CPT

## 2021-10-21 PROCEDURE — APPSS45 APP SPLIT SHARED TIME 31-45 MINUTES: Performed by: STUDENT IN AN ORGANIZED HEALTH CARE EDUCATION/TRAINING PROGRAM

## 2021-10-21 PROCEDURE — 80053 COMPREHEN METABOLIC PANEL: CPT

## 2021-10-21 PROCEDURE — 99233 SBSQ HOSP IP/OBS HIGH 50: CPT | Performed by: PSYCHIATRY & NEUROLOGY

## 2021-10-21 PROCEDURE — 71045 X-RAY EXAM CHEST 1 VIEW: CPT

## 2021-10-21 PROCEDURE — 94664 DEMO&/EVAL PT USE INHALER: CPT

## 2021-10-21 PROCEDURE — 97535 SELF CARE MNGMENT TRAINING: CPT

## 2021-10-21 PROCEDURE — 94761 N-INVAS EAR/PLS OXIMETRY MLT: CPT

## 2021-10-21 PROCEDURE — 36415 COLL VENOUS BLD VENIPUNCTURE: CPT

## 2021-10-21 PROCEDURE — 85025 COMPLETE CBC W/AUTO DIFF WBC: CPT

## 2021-10-21 PROCEDURE — 97116 GAIT TRAINING THERAPY: CPT

## 2021-10-21 PROCEDURE — 1210000000 HC MED SURG R&B

## 2021-10-21 RX ORDER — ALBUTEROL SULFATE 2.5 MG/3ML
2.5 SOLUTION RESPIRATORY (INHALATION) EVERY 4 HOURS PRN
Status: DISCONTINUED | OUTPATIENT
Start: 2021-10-21 | End: 2021-10-22 | Stop reason: HOSPADM

## 2021-10-21 RX ORDER — METOPROLOL SUCCINATE 25 MG/1
25 TABLET, EXTENDED RELEASE ORAL DAILY
Status: DISCONTINUED | OUTPATIENT
Start: 2021-10-21 | End: 2021-10-22 | Stop reason: HOSPADM

## 2021-10-21 RX ORDER — ALBUTEROL SULFATE 2.5 MG/3ML
2.5 SOLUTION RESPIRATORY (INHALATION) 2 TIMES DAILY
Status: DISCONTINUED | OUTPATIENT
Start: 2021-10-21 | End: 2021-10-21

## 2021-10-21 RX ORDER — AZITHROMYCIN 500 MG/1
500 TABLET, FILM COATED ORAL DAILY
Status: DISCONTINUED | OUTPATIENT
Start: 2021-10-21 | End: 2021-10-22 | Stop reason: HOSPADM

## 2021-10-21 RX ORDER — LOSARTAN POTASSIUM 25 MG/1
25 TABLET ORAL DAILY
Status: DISCONTINUED | OUTPATIENT
Start: 2021-10-21 | End: 2021-10-22 | Stop reason: HOSPADM

## 2021-10-21 RX ORDER — 3% SODIUM CHLORIDE 3 G/100ML
25 INJECTION, SOLUTION INTRAVENOUS CONTINUOUS
Status: DISPENSED | OUTPATIENT
Start: 2021-10-21 | End: 2021-10-21

## 2021-10-21 RX ADMIN — RIVAROXABAN 20 MG: 20 TABLET, FILM COATED ORAL at 20:43

## 2021-10-21 RX ADMIN — SODIUM CHLORIDE: 9 INJECTION, SOLUTION INTRAVENOUS at 11:52

## 2021-10-21 RX ADMIN — ALBUTEROL SULFATE 2.5 MG: 2.5 SOLUTION RESPIRATORY (INHALATION) at 23:52

## 2021-10-21 RX ADMIN — MUPIROCIN: 20 OINTMENT TOPICAL at 20:48

## 2021-10-21 RX ADMIN — AZITHROMYCIN 500 MG: 500 TABLET, FILM COATED ORAL at 11:43

## 2021-10-21 RX ADMIN — SODIUM CHLORIDE 25 ML/HR: 3 INJECTION, SOLUTION INTRAVENOUS at 11:46

## 2021-10-21 RX ADMIN — LOSARTAN POTASSIUM 25 MG: 25 TABLET, FILM COATED ORAL at 11:43

## 2021-10-21 RX ADMIN — PHENOBARBITAL 129.6 MG: 32.4 TABLET ORAL at 20:42

## 2021-10-21 RX ADMIN — GUAIFENESIN 200 MG: 200 SOLUTION ORAL at 12:29

## 2021-10-21 RX ADMIN — SODIUM CHLORIDE, PRESERVATIVE FREE 10 ML: 5 INJECTION INTRAVENOUS at 11:51

## 2021-10-21 RX ADMIN — METOPROLOL SUCCINATE 25 MG: 25 TABLET, EXTENDED RELEASE ORAL at 10:58

## 2021-10-21 RX ADMIN — MUPIROCIN: 20 OINTMENT TOPICAL at 17:19

## 2021-10-21 RX ADMIN — PHENYTOIN SODIUM 400 MG: 100 CAPSULE ORAL at 20:42

## 2021-10-21 RX ADMIN — GUAIFENESIN 200 MG: 200 SOLUTION ORAL at 20:52

## 2021-10-21 RX ADMIN — ACETAMINOPHEN 650 MG: 325 TABLET ORAL at 23:28

## 2021-10-21 RX ADMIN — MICONAZOLE NITRATE: 2 POWDER TOPICAL at 11:43

## 2021-10-21 RX ADMIN — MICONAZOLE NITRATE: 2 POWDER TOPICAL at 20:48

## 2021-10-21 ASSESSMENT — PAIN SCALES - GENERAL
PAINLEVEL_OUTOF10: 0

## 2021-10-21 ASSESSMENT — ENCOUNTER SYMPTOMS
SHORTNESS OF BREATH: 0
COUGH: 1
COUGH: 0
CHEST TIGHTNESS: 0
ALLERGIC/IMMUNOLOGIC NEGATIVE: 1
PHOTOPHOBIA: 0
TROUBLE SWALLOWING: 0
VOMITING: 0
NAUSEA: 0
DIARRHEA: 0

## 2021-10-21 NOTE — PROGRESS NOTES
[facility] Neurology Progress Note  Name: Royal Andres  Age: 61 y.o. Gender: female  CodeStatus: Full Code  Allergies: No Known Allergies    Chief Complaint:Abnormal Lab    Primary Care Provider: Riya Carver MD  InpatientTreatment Team: Treatment Team: Attending Provider: Anjali Nj MD; Consulting Physician: Melva Simpson MD; Consulting Physician: Bhaskar Gonzalez MD; Utilization Reviewer: Moose Smalls RN; Nursing Student: Lauren Reza; Patient Care Tech: Monique Zelaya; Registered Nurse: Bobby Alegria RN  Admission Date: 10/19/2021      HPI   Patient seen and examined for neurology follow-up for hyponatremia in the setting of longstanding seizure disorder. Currently continues informed milligrams of phenytoin daily as well as 129.6 mg of of phenobarbital daily. Patient alert and oriented x 3, cooperative, and in no acute distress. Patient's dizziness and fatigue have improved. Patient has a cough. No seizure activity. Patient is doing somewhat better without any major changes.   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 ([address]    Carotid stenosis, bilateral    Idiopathic generalized epilepsy ([address]    TIA (transient ischemic attack)    Obstructive sleep apnea syndrome    Encephalomalacia    Hyponatremia       Past Medical History:   Diagnosis Date    Atrial fibrillation ([address] 12/9/2014    Carotid stenosis, bilateral     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 No pertinent surgical history. Social History     Socioeconomic History    Marital status: Single     Spouse name: None    Number of children: None    Years of education: None    Highest education level: None   Occupational History    None   Tobacco Use    Smoking status: Never Smoker    Smokeless tobacco: Never Used   Substance and Sexual Activity    Alcohol use: No    Drug use: No    Sexual activity: None   Other Topics Concern    None   Social History Narrative    None     Social Determinants of Health     Financial Resource Strain:     Difficulty of Paying Living Expenses:    Food Insecurity:     Worried About Running Out of Food in the Last Year:     Ran Out of Food in the Last Year:    Transportation Needs:     Lack of Transportation (Medical):     Lack of Transportation (Non-Medical):    Physical Activity:     Days of Exercise per Week:     Minutes of Exercise per Session:    Stress:     Feeling of Stress :    Social Connections:     Frequency of Communication with Friends and Family:     Frequency of Social Gatherings with Friends and Family:     Attends Buddhism Services: Active Member of Clubs or Organizations:     Attends Club or Organization Meetings:     Marital Status:    Intimate Partner Violence:     Fear of Current or Ex-Partner:     Emotionally Abused:     Physically Abused:     Sexually Abused:         Vitals:    10/21/21 0715   BP: (!) 155/50   Pulse: 51   Resp:    Temp: 99.6 °F (37.6 °C)   SpO2: 96%       Review of Systems   Constitutional: Negative for chills, fatigue and fever. HENT: Negative for congestion and trouble swallowing. Eyes: Negative for photophobia and visual disturbance. Respiratory: Positive for cough.  Negative for chest tightness and shortness of breath. Cardiovascular: Negative for chest pain. Gastrointestinal: Negative for diarrhea, nausea and vomiting. Endocrine: Negative. Genitourinary: Negative. Musculoskeletal: Negative for gait problem. Allergic/Immunologic: Negative. Neurological: Negative for dizziness, tremors, seizures, syncope, facial asymmetry, speech difficulty, weakness, light-headedness, numbness and headaches. Hematological: Negative. Psychiatric/Behavioral: Negative for hallucinations and self-injury. Physical Exam  Vitals and nursing note reviewed. Constitutional:       General: She is awake. Appearance: Normal appearance. She is obese. HENT:      Head: Normocephalic and atraumatic. Eyes:      General: Lids are normal.      Extraocular Movements: Extraocular movements intact. Conjunctiva/sclera: Conjunctivae normal.      Pupils: Pupils are equal, round, and reactive to light. Cardiovascular:      Rate and Rhythm: Normal rate and regular rhythm. Pulses: Normal pulses. Heart sounds: Normal heart sounds. Pulmonary:      Effort: Pulmonary effort is normal.      Breath sounds: Normal breath sounds. Musculoskeletal:         General: Normal range of motion. Neurological:      General: No focal deficit present. Mental Status: She is alert and oriented to person, place, and time. Cranial Nerves: No cranial nerve deficit. Sensory: No sensory deficit. Motor: No weakness. Coordination: Coordination normal.      Deep Tendon Reflexes: Reflexes normal.   Psychiatric:         Mood and Affect: Mood normal.         Behavior: Behavior normal. Behavior is cooperative. Thought Content:  Thought content normal.     Exam nonfocal      Medications:  Reviewed    Infusion Medications:    sodium chloride      sodium chloride      sodium chloride 75 mL/hr at 10/20/21 7014     Scheduled Medications:    rivaroxaban  20 mg Oral Daily    sodium chloride flush  5-40 mL IntraVENous 2 times per day    miconazole   Topical BID    influenza virus vaccine  0.5 mL IntraMUSCular Prior to discharge    PHENobarbital  129.6 mg Oral Nightly    phenytoin  400 mg Oral Nightly     PRN Meds: sodium chloride flush, sodium chloride, ondansetron **OR** ondansetron, polyethylene glycol, acetaminophen **OR** acetaminophen, guaiFENesin    Labs:   Recent Labs     10/19/21  2000 10/20/21  0804   WBC 4.3* 4.1*   HGB 11.3* 11.1*   HCT 32.5* 32.0*    242     Recent Labs     10/20/21  1559 10/20/21  2025 10/21/21  0245   * 124* 126*   K 5.0* 4.7 4.6   CL 85* 89* 92*   CO2 25 23 22   BUN 28* 29* 28*   CREATININE 1.25* 1.35* 1.11*   CALCIUM 8.5 8.4* 8.4*     Recent Labs     10/19/21  2000 10/20/21  0804   AST 22 19   ALT 19 17   BILITOT <0.2 <0.2   ALKPHOS 106 94     No results for input(s): INR in the last 72 hours. No results for input(s): [place] in the last 72 hours. Urinalysis:   Lab Results   Component Value Date    NITRU Negative 10/19/2021    BLOODU Negative 10/19/2021    SPECGRAV 1.008 10/19/2021    GLUCOSEU Negative 10/19/2021       Radiology:   Most recent    EEG No valid procedures specified. MRI of Brain Results for orders placed during the hospital encounter of 11/18/14    MRI Brain W WO Contrast    Narrative  MRI BRAIN    REASON FOR EXAM  LOSS OF CENTRAL VISION. INCREASED \"PRESSURE\" AROUND  THE HEAD    COMPARISON  NONE AVAILABLE    TECHNIQUE  Routine unenhanced and postgadolinium enhanced  multisequence and multiplanar MRI scan of the brain was obtained. FINDINGS There is increased hyperintensity on the T1-weighted images  along the cortex of the right  middle temporal gyrus and adjacent right  occipital area. This is consistent with an area of laminar necrosis  which can be seen in subacute infarcts. This region shows some  hyperintensity on the DWI weighted images but demonstrates relatively  isointense signal on the ADC maps.  On the postgadolinium scans this  area shows diffuse enhancement. There is no associated mass effect. There is no hemorrhage. These findings are most consistent with a  subacute infarct at least 3weeks old. An area of infection is unlikely  given diffusion-weighted imaging  pattern and lack of mass effect. A  mass lesion or tumor is extremely unlikely given the pattern of  enhancement with lack of focal mass or mass effect. The basal cisterns, ventricles and sulci are normal.    There are bilateral patchy areas of high signal intensity in a  periventricular and subcortical distribution most consistent with  ischemic small vessel disease and aging. A more focal patch of the  deep white matter hyperintensity in the left periventricular parietal  area may be more acute. On the coronal T2-weighted images the flow void in the proximal right  PCA is not optimally visualized. While this may be a technical  limitation due to slice acquisition the possibility of some diminished  flow in that vessel is raised. A dedicated MRA of the brain might be  considered. Tiny retention cyst or polyp in the right maxillary sinus. Visualized sinuses and mastoids are otherwise unremarkable. Both globes are symmetric and normal in size and position. The  abductors are normal in course and caliber. The extraocular muscles  retrobulbar fat in ophthalmic veins appear normal. There is no soft  tissue mass    The craniocervical junction appears normal.    The soft tissues, fat and muscle planes at the skull base are symmetric  and normal.      IMPRESSION FINDINGS IN THE RIGHT TEMPORAL OCCIPITAL AREA ARE MOST  CONSISTENT WITH A SUBACUTE NONHEMORRHAGIC INFARCT AS DISCUSSED ABOVE. THE ORBITS AND THEIR CONTENTS APPEAR NORMAL. QUESTIONABLE DIMINISHED  FLOW VOID IN THE PROXIMAL RIGHT POSTERIOR CEREBRAL ARTERY. Harvin Kale By- Niel Sever M.D. Released By- Niel Sever M.D.   Released Date Time- 11/18/14 1422  This document has been electronically signed. ------------------------------------------------------------------------------  No results found for this or any previous visit. MRA of the Head and Neck: No results found for this or any previous visit. No results found for this or any previous visit. Results for orders placed during the hospital encounter of 12/26/14    MRA Head WO Contrast    Narrative  EXAMINATION MRA of the head without contrast    CLINICAL HISTORY Cerebral infarction, headaches, which are    COMPARISONS MRI of the brain dated 11/18/14    TECHNIQUE 3-D time-of-flight MRA imaging of the head was performed  without the use of contrast.  Motion artifact degrades the images. FINDINGS The vertebrobasilar junction, basilar artery, and basilar tip  appear unremarkable. There is occlusion of the right posterior  cerebral artery at the  P1 level. Left posterior cerebral artery  demonstrates focal high-grade stenosis at the mid portion of the P2  segment but remains patent. Motion artifact limits evaluation of the  supraclinoid portions of the internal carotid arteries. The remaining  more proximal visible portions appear patent without significant  stenosis. Motion artifact also limits evaluation of the M1 and  proximal portions of the M2 segments of the middle cerebral arteries  bilaterally, as well as the proximal portions of the anterior cerebral  arteries. However, on the prior MRI of 11/18/14, normal flow voids are  identified within these regions. Flow is noted within the distal  aspects of the middle and anterior cerebral arteries bilaterally. IMPRESSION OCCLUSION OF THE RIGHT POSTERIOR CEREBRAL ARTERY AT THE P1  SEGMENT. FOCAL HIGH-GRADE STENOSIS OF THE LEFT P2 SEGMENT.   ALTHOUGH  MOTION ARTIFACT LIMITS EVALUATION OF THE PROXIMAL PORTIONS OF THE  ANTERIOR AND MIDDLE CEREBRAL ARTERIES, THE DEMONSTRATE NORMAL FLOW  VOIDS ON THE PRIOR MRI OF 11/18/14. BR[employer]GET [employer] has been communicated to ÁNGEL FUENTES MD  via the [employer]  Critical Result system on 12/26/2014 905  AM, Message ID [id number]. Ernst Mendosa Carla Guillaume M.D. Released Carla Guillaume M.D. Released Date Time- 12/26/14 0908  This document has been electronically signed. ------------------------------------------------------------------------------                            CT of the Head: No results found for this or any previous visit. No results found for this or any previous visit. No results found for this or any previous visit. Carotid duplex: No results found for this or any previous visit. No results found for this or any previous visit. Results for orders placed during the hospital encounter of 12/28/17    US CAROTID ARTERY BILATERAL    Narrative  EXAMINATION: US CAROTID ARTERY BILATERAL:    DATE AND TIME: 12/28/2017 at 11:29 AM.    CLINICAL HISTORY: BILATERAL CAROTID ARTERY STENOSIS. COMPARISON: None. TECHNIQUE: Carotid duplex sonograms which include gray scale and color flow evaluation are complimented with spectral waveform analysis. Please refer to chart below for specific carotid velocity measurements. Validated velocity measurements with  angiographic measurements, velocity criteria are extrapolated from diameter data as defined by the Society of Radiologist in [address] [place] [address] Radiology 2003; 003;[id number]\". FINDINGS:    No significant plaque formation is seen. RIGHT ICA: < 50%. No significant plaque formation is seen. LEFT ICA: <50%. Antegrade flow in both vertebral arteries.       ARTERIAL BLOOD FLOW VELOCITY    RIGHT PS    Prox CCA    91 cm/s  Mid CCA     81 cm/s  Dist CCA    73 cm/s  Prox ICA    78 cm/s  Mid ICA     82 cm/s  Dist ICA     94 cm/s  Prox ECA    131 cm/s  Prox VERT   antegrade cm/s    ICA/CCA     1.17    LEFT PS    Prox CCA    73 cm/s  Mid CCA     92 cm/s  Dist CCA    53 cm/s  Prox ICA    75 cm/s  Mid ICA     77 cm/s  Dist ICA     95 cm/s  Prox ECA    77 cm/s  Prox VERT   antegrade    ICA/CCA     1.03    Impression  <50% STENOSIS IN THE RIGHT ICA.    <50% STENOSIS IN THE LEFT ICA. The degree of stenosis recorded on this exam uses the same method of stratification used in the NASCET trials. This complies with ACR practice guidelines and the Society of radiology in ultrasound consensus statement and provides adequate information for  clinical decision making. Society of Radiologists in Ultrasound (SRU) consensus statement was used to estimate internal carotid artery stenosis. Echo No results found for this or any previous visit. Assessment/Plan:  74-year-old female hospitalized for hyponatremia currently on phenytoin and phenobarbital for seizure activity. Hyponatremia is not related to phenytoin or phenobarbital.  Patient's seizures are well controlled on the above combination (patient has not had a seizure since 1978), and therefore we will not make any changes to her seizure medication at this time. We will obtain CT of the head to assess for subdural hematoma or other central causes of hyponatremia. Patient does have risk factors for stroke and had an occipital CVA in 2014. Given patient's hyponatremia, we will also obtain morning cortisol level and prolactin. Likely related to chlorthalidone she was on as an outpatient. Nephrology following. More recommendations to follow based on results of the above. CT of the head was negative for any acute intercranial process. Right occipital lobe cephalization. Nephrology following and recommends 3% hypertonic saline as well as fluid restriction. chlorthalidone discontinued. Sodium is now 126. Prolactin WNL. AM cortisol was cancelled. Patient is okay for discharge once sodium has improved.       I have personally performed a face to face diagnostic evaluation on this patient, reviewed all data and investigations, and am the sole provider of all clinical decisions on the neurological status of this patient. Patient sodium continues to improve no other intraocular pathology is notable. She has of small encephalomalacia in the right occipital lobe of no clinical significance. Patient is 3% hypertonic saline. The hyponatremia is not related to the phenytoin. Adam Crystal MD, David Stubbs, American Board of Psychiatry & Neurology  Board Certified in Vascular Neurology  Board Certified in Neuromuscular Medicine  Certified in Neurorehabilitation           Collaborating physicians: Dr Saqib Crystal    Electronically signed by Polly Kaye PA-C on 10/21/2021 at 9:09 AM

## 2021-10-21 NOTE — PROGRESS NOTES
Assessment completed this shift. Alert and oriented x4. Up with assist and walker. Denies pain or nausea. Wound/ scabbed area to 2 nd toe left foot. Bactroban and 2x2 to wound. Patient tolerated well. Large, soft brown BM this shift.   Electronically signed by Aminta Linda RN on 10/21/2021 at 6:14 PM

## 2021-10-21 NOTE — PROGRESS NOTES
Pt resting in bed. Up to the [facility] with 1 assist. meds given per orders. Has occasional cough. On 1200 fluid restriction. Fall precautions in place hourly rounds continue. Call light in reach.

## 2021-10-21 NOTE — PROGRESS NOTES
pack years  []   Pulmonary Disorder  (acute or chronic)  []   Severe or Chronic w/ Exacerbation  []     Surgical Status No [x]   Surgeries     General []   Surgery Lower []   Abdominal Thoracic or []   Upper Abdominal Thoracic with  PulmonaryDisorder  []     Chest X-ray Clear/Not  Ordered     [x]  Chronic Changes  Results Pending  []  Infiltrates, atelectasis, pleural effusion, or edema  []  Infiltrates in more than one lobe []  Infiltrate + Atelectasis, &/or pleural effusion  []    Respiratory Pattern Regular,  RR = 12-20 [x]  Increased,  RR = 21-25 []  KRAFT, irregular,  or RR = 26-30 []  Decreased FEV1  or RR = 31-35 []  Severe SOB, use  of accessory muscles, or RR ? 35  []    Mental Status Alert, oriented,  Cooperative [x]  Confused but Follows commands []  Lethargic or unable to follow commands []  Obtunded  []  Comatose  []    Breath Sounds Clear to  auscultation  [x]  Decreased unilaterally or  in bases only []  Decreased  bilaterally  []  Crackles or intermittent wheezes []  Wheezes []    Cough Strong, Spontan., & nonproductive []  Strong,  spontaneous, &  productive [x]  Weak,  Nonproductive []  Weak, productive or  with wheezes []  No spontaneous  cough or may require suctioning []    Level of Activity Ambulatory []  Ambulatory w/ Assist  [x]  Non-ambulatory []  Paraplegic []  Quadriplegic []    Total    Score:____2___     Triage Score:____5____      Tri       Triage:     1. (>20) Freq: Q3    2. (16-20) Freq: Q4   3. (11-15) Freq: QID & Albuterol Q2 PRN    4. (6-10) Freq: TID & Albuterol Q2 PRN    5. (0-5) Freq Q4prn

## 2021-10-21 NOTE — CARE COORDINATION
This LSW spoke with patient at length this afternoon re: D/C Plans. She does have [address] and SNF list but is denying SNF placement. Patient states that she will return home upon discharge. At this time she is thinking about [address] option and is willing to re-discuss upon discharge. Patient states that she has been successful with [employer].  Therapy services in the past.  Electronically signed by WILI Teran, FELIPE on 10/21/21 at 1:25 PM EDT

## 2021-10-21 NOTE — PROGRESS NOTES
Progress Note  Date:10/21/2021       Room:[place] [id number]  Patient Name:Penny Suresh     YOB: 1955     Age:66 y.o. Subjective    Subjective:  Symptoms:  She reports malaise and weakness. No shortness of breath, cough, chest pain, headache, chest pressure, anorexia, diarrhea or anxiety. Diet:  No nausea or vomiting. Review of Systems   Respiratory: Negative for cough and shortness of breath. Cardiovascular: Negative for chest pain. Gastrointestinal: Negative for anorexia, diarrhea, nausea and vomiting. Neurological: Positive for weakness. Objective         Vitals Last 24 Hours:  TEMPERATURE:  Temp  Av.2 °F (37.3 °C)  Min: 98.7 °F (37.1 °C)  Max: 99.6 °F (37.6 °C)  RESPIRATIONS RANGE: Resp  Av  Min: 18  Max: 18  PULSE OXIMETRY RANGE: SpO2  Av %  Min: 96 %  Max: 98 %  PULSE RANGE: Pulse  Av  Min: 51  Max: 55  BLOOD PRESSURE RANGE: Systolic (84YMC), YZ , Min:143 , NJS:564   ; Diastolic (88XUT), TV, Min:50, Max:60    I/O (24Hr): Intake/Output Summary (Last 24 hours) at 10/21/2021 1237  Last data filed at 10/21/2021 0548  Gross per 24 hour   Intake 1125 ml   Output --   Net 1125 ml     Objective:  General Appearance:  Comfortable, well-appearing and in no acute distress. Vital signs: (most recent): Blood pressure (!) 155/50, pulse 51, temperature 99.6 °F (37.6 °C), temperature source Oral, resp. rate 18, height 5' 5\" (1.651 m), weight (!) 326 lb (147.9 kg), SpO2 96 %. HEENT: Normal HEENT exam.    Lungs:  Normal effort. Heart: Normal rate. Regular rhythm. S1 normal.    Abdomen: Abdomen is soft. Bowel sounds are normal.   There is no epigastric area or suprapubic area tenderness. Pulses: Distal pulses are intact. Neurological: Patient is alert. Pupils:  Pupils are equal, round, and reactive to light. Skin:  Warm and dry.       Labs/Imaging/Diagnostics    Labs:  CBC:  Recent Labs     10/19/21  2000 10/20/21  0804 10/21/21  0844   WBC 4.3* 4.1* 3.4*   RBC 3.59* 3.45* 3.57*   HGB 11.3* 11.1* 11.2*   HCT 32.5* 32.0* 33.0*   MCV 90.7 92.8 92.4   RDW 13.9 14.1 14.1    242 279     CHEMISTRIES:  Recent Labs     10/20/21  0804 10/20/21  1159 10/21/21  0245 10/21/21  0844 10/21/21  1017   *  122*   < > 126* 127* 126*   K 4.6  4.7   < > 4.6 4.6 4.5   CL 87*  87*   < > 92* 90* 91*   CO2 23  23   < > 22 24 23   BUN 27*  26*   < > 28* 25* 26*   CREATININE 1.07*  1.01*   < > 1.11* 1.16* 1.05*   GLUCOSE 97  94   < > 98 104* 94   MG 1.5*  --   --  1.5*  --     < > = values in this interval not displayed. PT/INR:No results for input(s): PROTIME, INR in the last 72 hours. APTT:No results for input(s): APTT in the last 72 hours. LIVER PROFILE:  Recent Labs     10/19/21  2000 10/20/21  0804 10/21/21  0844   AST 22 19 17   ALT 19 17 15   BILITOT <0.2 <0.2 <0.2   ALKPHOS 106 94 95       Imaging Last 24 Hours:  XR CHEST (2 VW)    Result Date: 10/19/2021  Exam: XR CHEST (2 VW)  CLINICAL HISTORY: R05.9 Cough ICD10 COMPARISON: None CHEST X-ray, 2 VIEWS FINDINGS: The cardiomediastinal silhouette is within normal limits. There are no infiltrates, consolidations or effusions. Bones of the thorax appear intact. No radiographic evidence of acute intrathoracic process. Assessment//Plan           Hospital Problems         Last Modified POA    Hyponatremia 10/19/2021 Yes        Assessment & Plan    10/20: Most likely from chlorthalidone causing her to have hyponatremia. PT OT ordered. Anticipate discharge to 2 to 3 days based on patient clinical course. Spoke with sister at bedside. No overnight events. Denies any complaints. Patient lives by herself. Fall risk. 10/21 : Toprol-XL for uncontrolled hypertension, start losartan, chest x-ray today, sodium is improving. Anticipate discharge tomorrow. Start p.o. antibiotics for possible upper respiratory infection. Spoke with nursing about the care.   Otorrhea electronically signed by Diana Kitchen MD on 10/20/21 at 1:26 PM EDT

## 2021-10-21 NOTE — PROGRESS NOTES
Wound Ostomy Continence Nurse  Consult Note       NAME:  Kong Monteiro  MEDICAL RECORD NUMBER:  [id number]  AGE: 61 y.o. GENDER: female  : 1955  TODAY'S DATE:  10/21/2021    Subjective   Reason for [address] Nurse Evaluation and Assessment: Left 2nd toe      Kong Monteiro is a 61 y.o. female referred by:   [x] Physician  [x] Nursing  [] Other:     Wound Identification:  Wound Type: traumatic  Contributing Factors: decreased mobility and stubbed toe at home    Wound History: Patient explains that at home, she was attempting to step into the shower, when she began to slip and her left 2nd toe scraped across the floor into the wall. Current Wound Care Treatment:  Recommending 1) antibiotic ointment BID to the left 2nd toe 2) follow up in wound center as needed or slow healing of the toe abrasion    Patient Goal of Care:  [x] Wound Healing  [] Odor Control  [] Palliative Care  [] Pain Control   [] Other:         PAST MEDICAL HISTORY        Diagnosis Date    Atrial fibrillation ([facility] 2014    Carotid stenosis, bilateral     Cerebral infarction due to embolism ([facility]     Endometrial cancer ([facility] [address] [facility] 3/16/2015    Family history of premature CAD 2014    Generalized convulsive epilepsy ([facility] [facility] 3/16/2015    Hyperlipidemia     Idiopathic generalized epilepsy ([facility] [facility]     Obesity     Occipital infarction ([facility] [address] [facility] 2014       PAST SURGICAL HISTORY    History reviewed. No pertinent surgical history. FAMILY HISTORY    Family History   Family history unknown: Yes       SOCIAL HISTORY    Social History     Tobacco Use    Smoking status: Never Smoker    Smokeless tobacco: Never Used   Substance Use Topics    Alcohol use: No    Drug use: No       ALLERGIES    No Known Allergies    MEDICATIONS    No current facility-administered medications on file prior to encounter.      Current Outpatient Medications on File Prior to Encounter   Medication Sig Dispense Refill    Cholecalciferol (VITAMIN D) 50 MCG ( UT) CAPS capsule Take by mouth Daily      phenytoin (DILANTIN) 100 MG ER capsule Take 6 capsules by mouth daily take 6 at night (Patient taking differently: Take 400 mg by mouth daily take 4 at night) 180 capsule 3    XARELTO 20 MG TABS tablet Take 20 mg by mouth daily       MITIGARE 0.6 MG capsule TK ONE C PO BID PRN  1    chlorthalidone (HYGROTON) 25 MG tablet Take 25 mg by mouth daily       irbesartan (AVAPRO) 150 MG tablet Take 150 mg by mouth daily       NYSTATIN, TOPICAL, POWD Apply  topically.  PHENobarbital (LUMINAL) 64.8 MG tablet Take 129.6 mg by mouth daily.          Objective    BP (!) 155/50   Pulse 51   Temp 99.6 °F (37.6 °C) (Oral)   Resp 18   Ht 5' 5\" (1.651 m)   Wt (!) 326 lb (147.9 kg)   SpO2 96%   BMI 54.25 kg/m²     LABS:  WBC:    Lab Results   Component Value Date    WBC 3.4 10/21/2021     H/H:    Lab Results   Component Value Date    HGB 11.2 10/21/2021    HCT 33.0 10/21/2021     PTT:    Lab Results   Component Value Date    APTT 34.8 03/05/2016   [APTT}  PT/INR:    Lab Results   Component Value Date    PROTIME 10.9 03/01/2016    INR 1.0 03/01/2016     HgBA1c:  No results found for: LABA1C    Assessment   Quentin Risk Score: Quentin Scale Score: 20    Patient Active Problem List   Diagnosis    Extreme obesity    HLD (hyperlipidemia)    [facility]ipital infarction ([facility]    Family history of premature CAD    Generalized convulsive epilepsy ([facility]    Endometrial cancer ([facility]    Cerebral artery occlusion with cerebral infarction ([facility]    Cerebral infarction due to embolism ([facility]    Carotid stenosis, bilateral    Idiopathic generalized epilepsy ([facility]    TIA (transient ischemic attack)    Obstructive sleep apnea syndrome    Encephalomalacia    Hyponatremia       Measurements:  Wound 10/19/21 Toe (Comment  which one) Left 2nd Toe (Active)   Wound Image   10/21/21 1255   Wound Etiology Traumatic 10/21/21 1255   Dressing/Treatment Antibacterial ointment 10/21/21 1255   Dressing

## 2021-10-21 NOTE — PROGRESS NOTES
Nephrology Progress Note    Assessment:  Hyponatremia related to medications thiazide          (not dilantin)                                  Ancient seizure activity  Hypertension  Obesity  OHD AF history on Xarelto      Plan: continue to restrict fluids  3% to be given  No further thiazide  Add toprol for BP  Patient Active Problem List:     Extreme obesity     HLD (hyperlipidemia)     Occipital infarction ([facility]     Family history of premature CAD     [facility]     Endometrial cancer ([facility]     Cerebral artery occlusion with cerebral infarction ([facility]     Cerebral infarction due to embolism ([facility]     Carotid stenosis, bilateral     Idiopathic generalized epilepsy ([facility] [facility]     TIA (transient ischemic attack)     Obstructive sleep apnea syndrome     Encephalomalacia     Hyponatremia      Subjective:  Admit Date: 10/19/2021    Interval History: doing fine    Medications:  Scheduled Meds:   rivaroxaban  20 mg Oral Daily    sodium chloride flush  5-40 mL IntraVENous 2 times per day    miconazole   Topical BID    influenza virus vaccine  0.5 mL IntraMUSCular Prior to discharge    PHENobarbital  129.6 mg Oral Nightly    phenytoin  400 mg Oral Nightly     Continuous Infusions:   sodium chloride      sodium chloride      sodium chloride 75 mL/hr at 10/20/21 2103       CBC:   Recent Labs     10/19/21  2000 10/20/21  0804   WBC 4.3* 4.1*   HGB 11.3* 11.1*    242     CMP:    Recent Labs     10/20/21  1559 10/20/21  2025 10/21/21  0245   * 124* 126*   K 5.0* 4.7 4.6   CL 85* 89* 92*   CO2 25 23 22   BUN 28* 29* 28*   CREATININE 1.25* 1.35* 1.11*   GLUCOSE 93 95 98   CALCIUM 8.5 8.4* 8.4*   LABGLOM 43.0* 39.4* 49.4*     Troponin: No results for input(s): TROPONINI in the last 72 hours. BNP: No results for input(s): BNP in the last 72 hours. INR: No results for input(s): INR in the last 72 hours.   Lipids: No results for input(s): CHOL, LDLDIRECT, TRIG, HDL, AMYLASE, LIPASE in the last 72 hours. Liver:   Recent Labs     10/20/21  0804   AST 19   ALT 17   ALKPHOS 94   PROT 5.9*   LABALBU 3.6   BILITOT <0.2     Iron:  No results for input(s): IRONS, FERRITIN in the last 72 hours. Invalid input(s): LABIRONS  Urinalysis: No results for input(s): UA in the last 72 hours.     Objective:  Vitals: BP (!) 155/50   Pulse 51   Temp 99.6 °F (37.6 °C) (Oral)   Resp 18   Ht 5' 5\" (1.651 m)   Wt (!) 326 lb (147.9 kg)   SpO2 96%   BMI 54.25 kg/m²    Wt Readings from Last 3 Encounters:   10/19/21 (!) 326 lb (147.9 kg)   11/16/20 (!) 336 lb 11.2 oz (152.7 kg)   11/18/19 (!) 351 lb (159.2 kg)      24HR INTAKE/OUTPUT:      Intake/Output Summary (Last 24 hours) at 10/21/2021 0818  Last data filed at 10/21/2021 0548  Gross per 24 hour   Intake 1125 ml   Output --   Net 1125 ml       General: alert, in no apparent distress  HEENT: normocephalic, atraumatic, anicteric  Neck: supple, no mass  Lungs: non-labored respirations, clear to auscultation bilaterally  Heart: regular rate and rhythm, no murmurs or rubs  Abdomen: soft, non-tender, non-distended  Ext: no cyanosis, no peripheral edema  Neuro: alert and oriented, no gross abnormalities  Psych: normal mood and affect  Skin: no rash      Electronically signed by Opal Osorio DO, MD

## 2021-10-21 NOTE — PROGRESS NOTES
Physical Therapy Med Surg Daily Treatment Note  Facility/Department: [facility]  Room: [place] [id number]-       NAME: Kristina Davila  : 1955 (89 y.o.)  MRN: [id number]  CODE STATUS: Full Code    Date of Service: 10/21/2021    Patient Diagnosis(es): Hyponatremia [E87.1]   Chief Complaint   Patient presents with    Abnormal Lab     Patient Active Problem List    Diagnosis Date Noted    Hyponatremia 10/19/2021    Encephalomalacia 2021    TIA (transient ischemic attack) 2020    Obstructive sleep apnea syndrome 2020    Cerebral infarction due to embolism ([address]     Carotid stenosis, bilateral     Idiopathic generalized epilepsy ([address]     Generalized convulsive epilepsy ([address] 2015    Endometrial cancer ([address] 2015    Extreme obesity 2014    HLD (hyperlipidemia) 2014    Occipital infarction ([address] 2014    Family history of premature CAD 2014    Cerebral artery occlusion with cerebral infarction ([address] 2014        Past Medical History:   Diagnosis Date    Atrial fibrillation ([address] 2014    Carotid stenosis, bilateral     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 No pertinent surgical history. Restrictions  Restrictions/Precautions: Fall Risk    SUBJECTIVE   General  Chart Reviewed: Yes  Family / Caregiver Present: No  Subjective  Subjective: \"I would like to walk to the bathroom. \"    Pre-Session Pain Report  Pre Treatment Pain Screening  Pain at present: 0  Scale Used: Numeric Score  Intervention List: Patient able to continue with treatment  Pain Screening  Patient Currently in Pain: No       Post-Session Pain Report  Pain Assessment  Pain Assessment: 0-10  Pain Level: 0         OBJECTIVE        Bed mobility  Supine to Sit: Stand by assistance  Sit to Supine:  (DNT pt into chair to promote OOB activity.)    Transfers  Sit to Stand: Contact guard assistance  Stand to sit: Contact guard assistance  Comment: Verbal cues for hand placement and safe sequencing. Pt with impaired safety awareness and quick mobility, sitting without reaching back. Ambulation  Ambulation?: Yes  Ambulation 1  Surface: level tile  Device: Rolling Walker  Assistance: Minimal assistance  Quality of Gait: Assist for safe management of [facility]. Pt instructed in proper alignment and approximation within [facility]. Shuffling steps with mild FF. quick and unsteady at times. Distance: 13' x2                                         Activity Tolerance  Activity Tolerance: Patient Tolerated treatment well          ASSESSMENT   Assessment: pt with quick impulsive movement, unsteady, vc's for improved safety awareness and [facility] management. Discharge Recommendations:  Continue to assess pending progress, Patient would benefit from continued therapy after discharge    Goals  Long term goals  Long term goal 1: Pt to complete bed mobility with indep  Long term goal 2: Pt to complete transfers with indep  Long term goal 3: Pt to ambulate 50-150ft with [facility] indep  Long term goal 4: Pt to manage 4 steps with CGA    PLAN    Times per week: 3-6  Safety Devices  Type of devices: All fall risk precautions in place, Call light within reach, Chair alarm in place, Left in chair, Nurse notified     [facility] (6 CLICK) [address] Mobility Raw Score : 16      Therapy Time   Individual   Time In 1035   Time Out 1058   Minutes 23      BM/Trsf: 8  Gait: [facility], 10/21/21 at 12:03 PM         Definitions for assistance levels  Independent = pt does not require any physical supervision or assistance from another person for activity completion. Device may be needed.   Stand by assistance = pt requires verbal cues or instructions from another person, close to but not touching, to perform the activity  Minimal assistance= pt performs 75% or more of the activity; assistance is required to complete the activity  Moderate assistance= pt performs 50% of the activity; assistance is required to complete the activity  Maximal assistance = pt performs 25% of the activity; assistance is required to complete the activity  Dependent = pt requires total physical assistance to accomplish the task

## 2021-10-22 VITALS
BODY MASS INDEX: 48.82 KG/M2 | HEART RATE: 64 BPM | OXYGEN SATURATION: 97 % | HEIGHT: 65 IN | SYSTOLIC BLOOD PRESSURE: 138 MMHG | TEMPERATURE: 98.8 F | WEIGHT: 293 LBS | DIASTOLIC BLOOD PRESSURE: 65 MMHG | RESPIRATION RATE: 18 BRPM

## 2021-10-22 LAB
ALBUMIN SERPL-MCNC: 3.5 G/DL (ref 3.5–4.6)
ALP BLD-CCNC: 93 U/L (ref 40–130)
ALT SERPL-CCNC: 15 U/L (ref 0–33)
ANION GAP SERPL CALCULATED.3IONS-SCNC: 11 MEQ/L (ref 9–15)
ANION GAP SERPL CALCULATED.3IONS-SCNC: 13 MEQ/L (ref 9–15)
ANION GAP SERPL CALCULATED.3IONS-SCNC: 14 MEQ/L (ref 9–15)
AST SERPL-CCNC: 17 U/L (ref 0–35)
BASOPHILS ABSOLUTE: 0 K/UL (ref 0–0.2)
BASOPHILS RELATIVE PERCENT: 0.3 %
BILIRUB SERPL-MCNC: <0.2 MG/DL (ref 0.2–0.7)
BUN BLDV-MCNC: 24 MG/DL (ref 8–23)
BUN BLDV-MCNC: 24 MG/DL (ref 8–23)
BUN BLDV-MCNC: 25 MG/DL (ref 8–23)
CALCIUM SERPL-MCNC: 8.4 MG/DL (ref 8.5–9.9)
CALCIUM SERPL-MCNC: 8.5 MG/DL (ref 8.5–9.9)
CALCIUM SERPL-MCNC: 8.6 MG/DL (ref 8.5–9.9)
CHLORIDE BLD-SCNC: 94 MEQ/L (ref 95–107)
CHLORIDE BLD-SCNC: 95 MEQ/L (ref 95–107)
CHLORIDE BLD-SCNC: 96 MEQ/L (ref 95–107)
CO2: 22 MEQ/L (ref 20–31)
CO2: 22 MEQ/L (ref 20–31)
CO2: 23 MEQ/L (ref 20–31)
CREAT SERPL-MCNC: 1.11 MG/DL (ref 0.5–0.9)
CREAT SERPL-MCNC: 1.13 MG/DL (ref 0.5–0.9)
CREAT SERPL-MCNC: 1.16 MG/DL (ref 0.5–0.9)
EOSINOPHILS ABSOLUTE: 0.1 K/UL (ref 0–0.7)
EOSINOPHILS RELATIVE PERCENT: 0.9 %
GFR AFRICAN AMERICAN: 56.8
GFR AFRICAN AMERICAN: 58.5
GFR AFRICAN AMERICAN: 59.7
GFR NON-AFRICAN AMERICAN: 46.9
GFR NON-AFRICAN AMERICAN: 48.4
GFR NON-AFRICAN AMERICAN: 49.4
GLOBULIN: 2.3 G/DL (ref 2.3–3.5)
GLUCOSE BLD-MCNC: 107 MG/DL (ref 70–99)
GLUCOSE BLD-MCNC: 108 MG/DL (ref 70–99)
GLUCOSE BLD-MCNC: 122 MG/DL (ref 70–99)
HCT VFR BLD CALC: 31 % (ref 37–47)
HEMOGLOBIN: 11 G/DL (ref 12–16)
LYMPHOCYTES ABSOLUTE: 0.9 K/UL (ref 1–4.8)
LYMPHOCYTES RELATIVE PERCENT: 13.8 %
MAGNESIUM: 1.5 MG/DL (ref 1.7–2.4)
MCH RBC QN AUTO: 32.9 PG (ref 27–31.3)
MCHC RBC AUTO-ENTMCNC: 35.5 % (ref 33–37)
MCV RBC AUTO: 92.5 FL (ref 82–100)
MONOCYTES ABSOLUTE: 0.8 K/UL (ref 0.2–0.8)
MONOCYTES RELATIVE PERCENT: 11.4 %
NEUTROPHILS ABSOLUTE: 5.1 K/UL (ref 1.4–6.5)
NEUTROPHILS RELATIVE PERCENT: 73.6 %
OSMOLALITY URINE: 278 MOSM/KG
PDW BLD-RTO: 13.8 % (ref 11.5–14.5)
PLATELET # BLD: 250 K/UL (ref 130–400)
POTASSIUM SERPL-SCNC: 4.7 MEQ/L (ref 3.4–4.9)
POTASSIUM SERPL-SCNC: 4.7 MEQ/L (ref 3.4–4.9)
POTASSIUM SERPL-SCNC: 4.8 MEQ/L (ref 3.4–4.9)
RBC # BLD: 3.35 M/UL (ref 4.2–5.4)
SODIUM BLD-SCNC: 129 MEQ/L (ref 135–144)
SODIUM BLD-SCNC: 130 MEQ/L (ref 135–144)
SODIUM BLD-SCNC: 131 MEQ/L (ref 135–144)
TOTAL PROTEIN: 5.8 G/DL (ref 6.3–8)
WBC # BLD: 6.9 K/UL (ref 4.8–10.8)

## 2021-10-22 PROCEDURE — 2580000003 HC RX 258: Performed by: INTERNAL MEDICINE

## 2021-10-22 PROCEDURE — 80053 COMPREHEN METABOLIC PANEL: CPT

## 2021-10-22 PROCEDURE — 85025 COMPLETE CBC W/AUTO DIFF WBC: CPT

## 2021-10-22 PROCEDURE — 6370000000 HC RX 637 (ALT 250 FOR IP): Performed by: INTERNAL MEDICINE

## 2021-10-22 PROCEDURE — G0008 ADMIN INFLUENZA VIRUS VAC: HCPCS | Performed by: INTERNAL MEDICINE

## 2021-10-22 PROCEDURE — 6360000002 HC RX W HCPCS: Performed by: INTERNAL MEDICINE

## 2021-10-22 PROCEDURE — 83735 ASSAY OF MAGNESIUM: CPT

## 2021-10-22 PROCEDURE — 90686 IIV4 VACC NO PRSV 0.5 ML IM: CPT | Performed by: INTERNAL MEDICINE

## 2021-10-22 PROCEDURE — 36415 COLL VENOUS BLD VENIPUNCTURE: CPT

## 2021-10-22 RX ORDER — AZITHROMYCIN 250 MG/1
250 TABLET, FILM COATED ORAL SEE ADMIN INSTRUCTIONS
Qty: 6 TABLET | Refills: 0 | Status: SHIPPED | OUTPATIENT
Start: 2021-10-22 | End: 2021-10-27

## 2021-10-22 RX ORDER — METOPROLOL SUCCINATE 25 MG/1
25 TABLET, EXTENDED RELEASE ORAL DAILY
Qty: 30 TABLET | Refills: 3 | Status: SHIPPED | OUTPATIENT
Start: 2021-10-23

## 2021-10-22 RX ADMIN — LOSARTAN POTASSIUM 25 MG: 25 TABLET, FILM COATED ORAL at 08:23

## 2021-10-22 RX ADMIN — AZITHROMYCIN 500 MG: 500 TABLET, FILM COATED ORAL at 08:22

## 2021-10-22 RX ADMIN — METOPROLOL SUCCINATE 25 MG: 25 TABLET, EXTENDED RELEASE ORAL at 08:23

## 2021-10-22 RX ADMIN — INFLUENZA A VIRUS A/VICTORIA/2570/2019 IVR-215 (H1N1) ANTIGEN (PROPIOLACTONE INACTIVATED), INFLUENZA A VIRUS A/CAMBODIA/E0826360/2020 IVR-224 (H3N2) ANTIGEN (PROPIOLACTONE INACTIVATED), INFLUENZA B VIRUS B/VICTORIA/705/2018 BVR-11 ANTIGEN (PROPIOLACTONE INACTIVATED), INFLUENZA B VIRUS B/PHUKET/3073/2013 BVR-1B ANTIGEN (PROPIOLACTONE INACTIVATED) 0.5 ML: 15; 15; 15; 15 INJECTION, SUSPENSION INTRAMUSCULAR at 11:10

## 2021-10-22 RX ADMIN — SODIUM CHLORIDE: 9 INJECTION, SOLUTION INTRAVENOUS at 04:50

## 2021-10-22 NOTE — PROGRESS NOTES
Nephrology Progress Note    Assessment:  Hyponatremia  CKD-3  Obesity  Seizure remote      Plan: maintain fluid restriction  D/c IV no diuretics  Will maintain Toprol on discharge    Patient Active Problem List:     Extreme obesity     HLD (hyperlipidemia)     [facility]     Family history of premature CAD     Generalized convulsive epilepsy ([facility]     Endometrial cancer ([facility]     Cerebral artery occlusion with cerebral infarction ([facility]     Cerebral infarction due to embolism ([facility]     Carotid stenosis, bilateral     Idiopathic generalized epilepsy ([facility]     TIA (transient ischemic attack)     Obstructive sleep apnea syndrome     Encephalomalacia     Hyponatremia      Subjective:  Admit Date: 10/19/2021    Interval History: doing well    Medications:  Scheduled Meds:   metoprolol succinate  25 mg Oral Daily    azithromycin  500 mg Oral Daily    losartan  25 mg Oral Daily    sodium chloride  2 spray Each Nostril TID    mupirocin   Topical BID    rivaroxaban  20 mg Oral Daily    sodium chloride flush  5-40 mL IntraVENous 2 times per day    miconazole   Topical BID    influenza virus vaccine  0.5 mL IntraMUSCular Prior to discharge    PHENobarbital  129.6 mg Oral Nightly    phenytoin  400 mg Oral Nightly     Continuous Infusions:   sodium chloride      sodium chloride 75 mL/hr at 10/22/21 0450       CBC:   Recent Labs     10/21/21  0844 10/22/21  0642   WBC 3.4* 6.9   HGB 11.2* 11.0*    250     CMP:    Recent Labs     10/21/21  2109 10/22/21  0300 10/22/21  0642   * 129* 131*  130*   K 4.5 4.8 4.7  4.7   CL 94* 94* 95  96   CO2 24 22 22  23   BUN 26* 25* 24*  24*   CREATININE 1.13* 1.13* 1.16*  1.11*   GLUCOSE 96 122* 108*  107*   CALCIUM 9.0 8.5 8.6  8.4*   LABGLOM 48.4* 48.4* 46.9*  49.4*     Troponin: No results for input(s): TROPONINI in the last 72 hours. BNP: No results for input(s): BNP in the last 72 hours.   INR: No results for input(s): INR in the last 72 hours. Lipids: No results for input(s): CHOL, LDLDIRECT, TRIG, HDL, AMYLASE, LIPASE in the last 72 hours. Liver:   Recent Labs     10/22/21  0642   AST 17   ALT 15   ALKPHOS 93   PROT 5.8*   LABALBU 3.5   BILITOT <0.2     Iron:  No results for input(s): IRONS, FERRITIN in the last 72 hours. Invalid input(s): LABIRONS  Urinalysis: No results for input(s): UA in the last 72 hours.     Objective:  Vitals: /65   Pulse 64   Temp 98.8 °F (37.1 °C) (Oral)   Resp 18   Ht 5' 5\" (1.651 m)   Wt (!) 326 lb (147.9 kg)   SpO2 97%   BMI 54.25 kg/m²    Wt Readings from Last 3 Encounters:   10/19/21 (!) 326 lb (147.9 kg)   11/16/20 (!) 336 lb 11.2 oz (152.7 kg)   11/18/19 (!) 351 lb (159.2 kg)      24HR INTAKE/OUTPUT:      Intake/Output Summary (Last 24 hours) at 10/22/2021 6099  Last data filed at 10/21/2021 1719  Gross per 24 hour   Intake 1520 ml   Output --   Net 1520 ml       General: alert, in no apparent distress  HEENT: normocephalic, atraumatic, anicteric  Neck: supple, no mass  Lungs: non-labored respirations, clear to auscultation bilaterally  Heart: regular rate and rhythm, no murmurs or rubs  Abdomen: soft, non-tender, non-distended  Ext: no cyanosis, no peripheral edema  Neuro: alert and oriented, no gross abnormalities  Psych: normal mood and affect  Skin: no rash      Electronically signed by Marjorie Harris DO, MD

## 2021-10-22 NOTE — DISCHARGE INSTR - COC
Continuity of Care Form    Patient Name: Shannon Wakler   :  1955  MRN:  [id number]    Admit date:  10/19/2021  Discharge date:  ***    Code Status Order: Full Code   Advance Directives:     Admitting Physician: Tuyet Daniel MD  PCP: Joi Logan MD    Discharging Nurse: [facility] Unit/Room#: E726/F640-21  Discharging Unit Phone Number: ***    Emergency Contact:   Extended Emergency Contact Information  Primary Emergency Contact: Peter Leose of [address] [place] [address] Phone: [phone]  Relation: Brother/Sister  Secondary Emergency Contact: Antony Cherry  [place] Phone: [phone]  Relation: Other    Past Surgical History:  History reviewed. No pertinent surgical history.     Immunization History:   Immunization History   Administered Date(s) Administered    COVID-19, Moderna, PF, 100mcg/0.5mL 2021, 2021    Tdap (Boostrix, Adacel) 2017       Active Problems:  Patient Active Problem List   Diagnosis Code    Extreme obesity E66.8    HLD (hyperlipidemia) E78.5    Occipital infarction ([address] I63.9    Family history of premature CAD Z82.49    Generalized convulsive epilepsy ([address] G40.309    Endometrial cancer ([address] C54.1    Cerebral artery occlusion with cerebral infarction ([address] I63.50    Cerebral infarction due to embolism ([address] I63.40    Carotid stenosis, bilateral I65.23    Idiopathic generalized epilepsy ([facility] G40.309    TIA (transient ischemic attack) G45.9    Obstructive sleep apnea syndrome G47.33    Encephalomalacia G93.89    Hyponatremia E87.1       Isolation/Infection:   Isolation          Contact  Contact  Droplet        Patient Infection Status     Infection Onset Added Last Indicated Last Indicated By Review Planned Expiration Resolved Resolved By    Rhinovirus 10/20/21 10/20/21 10/20/21 Respiratory Panel, Molecular, with COVID-19 (Restricted: peds pts or suitable admitted adults) 10/22/21       Resolved    COVID-19 Rule Out 10/20/21 10/20/21 10/20/21 Respiratory Panel, Molecular, with COVID-19 (Restricted: peds pts or suitable admitted adults) (Ordered)   10/20/21 Rule-Out Test Resulted          Nurse Assessment:  Last Vital Signs: /65   Pulse 64   Temp 98.8 °F (37.1 °C) (Oral)   Resp 18   Ht 5' 5\" (1.651 m)   Wt (!) 326 lb (147.9 kg)   SpO2 97%   BMI 54.25 kg/m²     Last documented pain score (0-10 scale): Pain Level: 0  Last Weight:   Wt Readings from Last 1 Encounters:   10/19/21 (!) 326 lb (147.9 kg)     Mental Status:  {IP PT MENTAL STATUS:}    IV Access:  { CHANI IV ACCESS:471533962}    Nursing Mobility/ADLs:  Walking   {CHP DME ZSIY:023760255}  Transfer  {CHP DME OAX}  Bathing  {CHP DME IIKQ:367455066}  Dressing  {CHP DME MAUV:993467375}  Toileting  {CHP DME UYZW:835676175}  Feeding  {CHP DME ADÁN:194402619}  Med Admin  {P DME UBYF:735544951}  Med Delivery   { CHANI MED Delivery:151647539}    Wound Care Documentation and Therapy:  Wound 10/19/21 Toe (Comment  which one) Left 2nd Toe (Active)   Wound Image   10/21/21 1255   Wound Etiology Traumatic 10/22/21 0948   Dressing Status Other (Comment) 10/21/21 2224   Dressing/Treatment Pharmaceutical agent (see MAR) 10/21/21 222   Dressing Change Due 10/21/21 10/22/21 0948   Wound Length (cm) 1 cm 10/21/21 1255   Wound Width (cm) 1 cm 10/21/21 1255   Wound Depth (cm) 0 cm 10/21/21 1255   Wound Surface Area (cm^2) 1 cm^2 10/21/21 1255   Wound Volume (cm^3) 0 cm^3 10/21/21 1255   Wound Assessment Pink/red 10/22/21 0948   Drainage Amount None 10/22/21 0948   Odor None 10/22/21 0948   Jennifer-wound Assessment Blanchable erythema 10/21/21 2224   Margins Undefined edges 10/21/21 1255   Wound Thickness Description not for Pressure Injury Partial thickness 10/21/21 1255   Number of days: 3        Elimination:  Continence:   · Bowel: {YES / NS:43132}  · Bladder: {YES / RZ:56803}  Urinary Catheter: {Urinary Catheter:686146390}   Colostomy/Ileostomy/Ileal Conduit: {YES / OS:00003}       Date of Last BM: ***    Intake/Output Summary (Last 24 hours) at 10/22/2021 1014  Last data filed at 10/21/2021 1719  Gross per 24 hour   Intake 1520 ml   Output --   Net 1520 ml     I/O last 3 completed shifts: In: 1520 [P.O.:720;  I.V.:800]  Out: -     Safety Concerns:     [facility] [employer] Safety Concerns:542382711}    Impairments/Disabilities:      [address] [facility] Impairments/Disabilities:519562102}    Nutrition Therapy:  Current Nutrition Therapy:   [address] [facility] Diet List:131128599}    Routes of Feeding: {CHP DME Other Feedings:681815595}  Liquids: {Slp liquid thickness:11199}  Daily Fluid Restriction: {CHP DME Yes amt example:381498388}  Last Modified Barium Swallow with Video (Video Swallowing Test): {Done Not Done QCAO:587061086}    Treatments at the Time of Hospital Discharge:   Respiratory Treatments: ***  Oxygen Therapy:  {Therapy; copd oxygen:96873}  Ventilator:    { CC Vent XONP:130527893}    Rehab Therapies: {THERAPEUTIC INTERVENTION:5701639263}  Weight Bearing Status/Restrictions: [facility] Weight Bearin}  Other Medical Equipment (for information only, NOT a DME order):  {EQUIPMENT:954232360}  Other Treatments: ***    Patient's personal belongings (please select all that are sent with patient):  {P DME Belongings:523037020}    RN SIGNATURE:  {Esignature:150218245}    CASE MANAGEMENT/SOCIAL WORK SECTION    Inpatient Status Date: ***    Readmission Risk Assessment Score:  Readmission Risk              Risk of Unplanned Readmission:  17           Discharging to Facility/ Agency   · Name: [facility]  · Address:  · Phone: [phone]  · Fax:    Dialysis Facility (if applicable)   · Name:  · Address:  · Dialysis Schedule:  · Phone:  · Fax:    / signature: {Esignature:051973941}    PHYSICIAN SECTION    Prognosis: {Prognosis:9467086309}    Condition at Discharge: [facility] Patient Condition:369512133}    Rehab Potential (if transferring to Rehab): {Prognosis:4100743299}    Recommended Labs or Other Treatments After Discharge: ***    Physician Certification: I certify the above information and transfer of Harry Patrick  is necessary for the continuing treatment of the diagnosis listed and that she requires {Admit to Appropriate Level of Care:18538} for {GREATER/LESS:337856139} 30 days.      Update Admission H&P: {CHP DME Changes in [place]YNE:858666696}    PHYSICIAN SIGNATURE:  {Esignature:627750317}

## 2021-10-22 NOTE — CARE COORDINATION
This LSW met with patient and sister at bedside this am. Patient is in agreement to [address] [place] [address], PT/OT services upon discharge. Freedom of choice given . Patient has chosen [facility]. I notified Marino Ashley at [facility] of potential referral. Patient address and phone # verified.   Electronically signed by WILI Villasenor, LSW on 10/22/21 at 10:13 AM EDT

## 2021-10-22 NOTE — PROGRESS NOTES
Shift assessment completed. Patient up with walker and 1 assist to bathroom. Alert and oriented x 4. Denies pain. Skin treatments applied per order. Given Tylenol for low grade temp. 100.3. Continues on PO Zithromax.

## 2021-10-28 DIAGNOSIS — G40.309 GENERALIZED CONVULSIVE EPILEPSY (HCC): Primary | ICD-10-CM

## 2021-10-29 RX ORDER — PHENOBARBITAL 64.8 MG/1
64.8 TABLET ORAL 2 TIMES DAILY
Qty: 180 TABLET | Refills: 0 | Status: SHIPPED | OUTPATIENT
Start: 2021-10-29 | End: 2022-01-21 | Stop reason: SDUPTHER

## 2021-11-03 ENCOUNTER — TELEPHONE (OUTPATIENT)
Dept: NEUROLOGY | Age: 66
End: 2021-11-03

## 2021-11-03 DIAGNOSIS — G47.33 OBSTRUCTIVE SLEEP APNEA SYNDROME: Primary | ICD-10-CM

## 2021-11-03 NOTE — TELEPHONE ENCOUNTER
Patient called, said she was told while inpatient that she should have another sleep study. Please pend order if you feel appropriate.   Thanks  Camryn Rubio

## 2021-11-04 NOTE — DISCHARGE SUMMARY
Discharge Summary    Date: 11/4/2021  Patient Name: Obdulia Peña YOB: 1955 Age: 61 y.o. Admit Date: 10/19/2021  Discharge Date: 10/22/2021  Discharge Condition:    Admission Diagnosis  Hyponatremia (E87.1)     Discharge Diagnosis  Active Problems: HyponatremiaResolved Problems: * No resolved hospital problems. [facility] Stay  Narrative of Hospital Course:  Patient called hyponatremia secondary to chlorthalidone. Patient was received IV fluids and physical therapy. Patient also received IV antibiotics for possible upper respiratory infection. Patient is stable to be discharged home. Consultants:  IP CONSULT TO NEPHROLOGYIP CONSULT TO NEUROLOGYIP CONSULT TO HOME CARE NEEDS    Surgeries/procedures Performed:       Treatments:           Discharge Plan/Disposition:  Home    Hospital/Incidental Findings Requiring Follow Up:    Patient Instructions:    Diet:    Activity:  For number of days (if applicable): Other Instructions:    Provider Follow-Up:   No follow-ups on file. Significant Diagnostic Studies:    Recent Labs:  Admission on 10/19/2021, Discharged on 10/22/2021No results displayed because visit has over 200 results. ------------    Radiology last 7 days:  No results found. Pending Labs Order Current Status Creatinine, Random Urine Collected (10/20/21 0052)      Discharge Medications    Discharge Medication List as of 10/22/2021 11:14 AMSTART taking these medicationsmetoprolol succinate (TOPROL XL) 25 MG extended release tabletTake 1 tablet by mouth daily, Disp-30 tablet, R-3Normalsodium chloride (OCEAN, BABY AYR) 0.65 % nasal spray2 sprays by Nasal route three times daily for 7 days, Disp-1 each, R-0Normalmiconazole (MICOTIN) 2 % powderApply topically 2 times daily. , Disp-45 g, R-1, Normalazithromycin (ZITHROMAX) 250 MG tabletTake 1 tablet by mouth See Admin Instructions for 5 days 500mg on day 1 followed by 250mg on days 2 - 5, Disp-6 tablet, R-0Normal    Discharge Medication List as of 10/22/2021 11:14 AM    Discharge Medication List as of 10/22/2021 11:14 AMCONTINUE these medications which have NOT CHANGEDCholecalciferol (VITAMIN D) 50 MCG (2000 UT) CAPS capsuleTake by mouth DailyHistorical Medphenytoin (DILANTIN) 100 MG ER capsuleTake 6 capsules by mouth daily take 6 at night, Disp-180 capsule, R-3NormalXARELTO 20 MG TABS tabletTake 20 mg by mouth daily , DAWHistorical MedMITIGARE 0.6 MG capsuleTK ONE C PO BID PRN, R-1, DAWHistorical Medirbesartan (AVAPRO) 150 MG tabletTake 150 mg by mouth daily Historical MedNYSTATIN, TOPICAL, POWDApply  topically. PHENobarbital (LUMINAL) 64.8 MG tabletTake 129.6 mg by mouth daily. Discharge Medication List as of 10/22/2021 11:14 AMSTOP taking these medicationschlorthalidone (HYGROTON) 25 MG tabletComments:Reason for Stopping:    Time Spent on Discharge:E] minutes were spent in patient examination, evaluation, counseling as well as medication reconciliation, prescriptions for required medications, discharge plan, and follow up.     Electronically signed by Luc Brasher MD on 11/4/21 at 11:33 AM EDT   overtime on dc summary was 45 min

## 2021-11-11 ENCOUNTER — TELEPHONE (OUTPATIENT)
Dept: NEUROLOGY | Age: 66
End: 2021-11-11

## 2021-11-11 ENCOUNTER — HOSPITAL ENCOUNTER (OUTPATIENT)
Dept: ULTRASOUND IMAGING | Age: 66
Discharge: HOME | End: 2021-11-13
Payer: COMMERCIAL

## 2021-11-11 DIAGNOSIS — G45.9 TIA (TRANSIENT ISCHEMIC ATTACK): ICD-10-CM

## 2021-11-11 DIAGNOSIS — G40.309 NONINTRACTABLE GENERALIZED IDIOPATHIC EPILEPSY WITHOUT STATUS EPILEPTICUS (HCC): Primary | ICD-10-CM

## 2021-11-11 PROCEDURE — 93880 EXTRACRANIAL BILAT STUDY: CPT

## 2021-11-11 NOTE — TELEPHONE ENCOUNTER
Patient called and requested an order to get her dilantin checked. She wants to start driving again but wants to be sure that her levels are good before she tries.           (mail order to pt to take to [facility])    Thanks  Stacie

## 2021-11-16 LAB
ANION GAP SERPL CALCULATED.3IONS-SCNC: 11 MEQ/L (ref 9–15)
BUN BLDV-MCNC: 27 MG/DL (ref 8–23)
CALCIUM SERPL-MCNC: 8.9 MG/DL (ref 8.5–9.9)
CHLORIDE BLD-SCNC: 96 MEQ/L (ref 95–107)
CO2: 23 MEQ/L (ref 20–31)
CREAT SERPL-MCNC: 1.38 MG/DL (ref 0.5–0.9)
GFR AFRICAN AMERICAN: 46.5
GFR NON-AFRICAN AMERICAN: 38.4
GLUCOSE BLD-MCNC: 88 MG/DL (ref 70–99)
POTASSIUM SERPL-SCNC: 5.5 MEQ/L (ref 3.4–4.9)
SODIUM BLD-SCNC: 130 MEQ/L (ref 135–144)

## 2022-01-20 DIAGNOSIS — G40.309 GENERALIZED CONVULSIVE EPILEPSY (HCC): ICD-10-CM

## 2022-01-21 RX ORDER — PHENOBARBITAL 64.8 MG/1
64.8 TABLET ORAL 2 TIMES DAILY
Qty: 180 TABLET | Refills: 0 | Status: SHIPPED | OUTPATIENT
Start: 2022-01-21 | End: 2022-03-30 | Stop reason: SDUPTHER

## 2022-03-22 ENCOUNTER — OFFICE VISIT (OUTPATIENT)
Dept: INFECTIOUS DISEASES | Age: 67
End: 2022-03-22
Payer: MEDICARE

## 2022-03-22 VITALS
DIASTOLIC BLOOD PRESSURE: 67 MMHG | HEIGHT: 65 IN | WEIGHT: 293 LBS | RESPIRATION RATE: 20 BRPM | TEMPERATURE: 98.8 F | HEART RATE: 59 BPM | BODY MASS INDEX: 48.82 KG/M2 | SYSTOLIC BLOOD PRESSURE: 129 MMHG

## 2022-03-22 DIAGNOSIS — A49.02 MRSA (METHICILLIN RESISTANT STAPHYLOCOCCUS AUREUS) INFECTION: ICD-10-CM

## 2022-03-22 DIAGNOSIS — L02.31 LEFT BUTTOCK ABSCESS: Primary | ICD-10-CM

## 2022-03-22 PROCEDURE — 1123F ACP DISCUSS/DSCN MKR DOCD: CPT | Performed by: INTERNAL MEDICINE

## 2022-03-22 PROCEDURE — 4040F PNEUMOC VAC/ADMIN/RCVD: CPT | Performed by: INTERNAL MEDICINE

## 2022-03-22 PROCEDURE — 99203 OFFICE O/P NEW LOW 30 MIN: CPT | Performed by: INTERNAL MEDICINE

## 2022-03-22 PROCEDURE — G8417 CALC BMI ABV UP PARAM F/U: HCPCS | Performed by: INTERNAL MEDICINE

## 2022-03-22 PROCEDURE — G8482 FLU IMMUNIZE ORDER/ADMIN: HCPCS | Performed by: INTERNAL MEDICINE

## 2022-03-22 PROCEDURE — G8427 DOCREV CUR MEDS BY ELIG CLIN: HCPCS | Performed by: INTERNAL MEDICINE

## 2022-03-22 PROCEDURE — 1036F TOBACCO NON-USER: CPT | Performed by: INTERNAL MEDICINE

## 2022-03-22 PROCEDURE — 3017F COLORECTAL CA SCREEN DOC REV: CPT | Performed by: INTERNAL MEDICINE

## 2022-03-22 PROCEDURE — 1090F PRES/ABSN URINE INCON ASSESS: CPT | Performed by: INTERNAL MEDICINE

## 2022-03-22 PROCEDURE — G8400 PT W/DXA NO RESULTS DOC: HCPCS | Performed by: INTERNAL MEDICINE

## 2022-03-22 RX ORDER — DOXYCYCLINE HYCLATE 100 MG
100 TABLET ORAL 2 TIMES DAILY
Qty: 20 TABLET | Refills: 0 | Status: SHIPPED | OUTPATIENT
Start: 2022-03-22 | End: 2022-04-01

## 2022-03-22 RX ORDER — AMOXICILLIN AND CLAVULANATE POTASSIUM 875; 125 MG/1; MG/1
1 TABLET, FILM COATED ORAL EVERY 12 HOURS
COMMUNITY
Start: 2021-10-27 | End: 2022-04-18

## 2022-03-22 RX ORDER — CHLORHEXIDINE GLUCONATE 213 G/1000ML
SOLUTION TOPICAL
Qty: 946 ML | Refills: 5 | Status: SHIPPED | OUTPATIENT
Start: 2022-03-22

## 2022-03-22 NOTE — PROGRESS NOTES
John Moreland (:  1955) is a 74 y.o. female,New patient, here for evaluation of the following chief complaint(s):  Abscess (Left buttock wound abscess (MRSA ))         ASSESSMENT/PLAN:  Left buttock abscess  MRSA infection with recurrent boils    Doxycycline 100 mg p.o. twice daily for 10 days  Bactroban ointment intranasally twice daily for for 3-month  Bactroban ointment to left buttock wound twice daily  Hibiclens body wash 3 times weekly for 3 months  Follow-up in 2 weeks  Patient was instructed to stay off her buttock as much as she can. She reports that she spends a lot of time in the recliner. She was instructed to lay on her side and relieve pressure to improve wound healing. Subjective   SUBJECTIVE/OBJECTIVE:  HPI   Referred by Dr. Damon Fernandez for positive MRSA wound culture. Patient reported recurrent skin boils over buttock area since she was hospitalized back in October. Last episode was mid February with swelling, redness pain and drainage. Patient was treated with 2 weeks of oral Augmentin with slow clinical improvement. Currently with small amount of drainage and persistent wound. She was seen by Dr. Vero Nj at [facility] who ordered an ultrasound that is not done yet. Patient is currently on no antibiotics. Culture obtained by primary care physician showed MRSA. Past Medical History:   Diagnosis Date    Atrial fibrillation ([address] 2014    Carotid stenosis, bilateral     Cerebral infarction due to embolism ([address]     Endometrial cancer ([address] 3/16/2015    Family history of premature CAD 2014    Generalized convulsive epilepsy ([address] 3/16/2015    Hyperlipidemia     Idiopathic generalized epilepsy ([address]     Obesity     Occipital infarction ([address] 2014     No past surgical history on file.   Social History     Socioeconomic History    Marital status: Single     Spouse name: Not on file    Number of children: Not on file    Years of education: Not on file    Highest education level: Not on file   Occupational History    Not on file   Tobacco Use    Smoking status: Never Smoker    Smokeless tobacco: Never Used   Substance and Sexual Activity    Alcohol use: No    Drug use: No    Sexual activity: Not on file   Other Topics Concern    Not on file   Social History Narrative    Not on file     Social Determinants of Health     Financial Resource Strain:     Difficulty of Paying Living Expenses: Not on file   Food Insecurity:     Worried About Running Out of Food in the Last Year: Not on file    James of Food in the Last Year: Not on file   Transportation Needs:     Lack of Transportation (Medical): Not on file    Lack of Transportation (Non-Medical): Not on file   Physical Activity:     Days of Exercise per Week: Not on file    Minutes of Exercise per Session: Not on file   Stress:     Feeling of Stress : Not on file   Social Connections:     Frequency of Communication with Friends and Family: Not on file    Frequency of Social Gatherings with Friends and Family: Not on file    Attends Hinduism Services: Not on file    Active Member of [address] [place] [address] or Organizations: Not on file    Attends Club or Organization Meetings: Not on file    Marital Status: Not on file   Intimate Partner Violence:     Fear of Current or Ex-Partner: Not on file    Emotionally Abused: Not on file    Physically Abused: Not on file    Sexually Abused: Not on file   Housing Stability:     Unable to Pay for Housing in the Last Year: Not on file    Number of [place] in the Last Year: Not on file    Unstable Housing in the Last Year: Not on file     Family History   Family history unknown:  Yes     Allergies   Allergen Reactions    Diltiazem Other (See Comments)     Causes low pulse rate    Adhesive Tape Rash     When skin is irritated      Current Outpatient Medications on File Prior to Visit   Medication Sig Dispense Refill    PHENobarbital (LUMINAL) 64.8 MG tablet Take 1 tablet by mouth 2 times daily for 90 days. 180 tablet 0    metoprolol succinate (TOPROL XL) 25 MG extended release tablet Take 1 tablet by mouth daily 30 tablet 3    miconazole (MICOTIN) 2 % powder Apply topically 2 times daily. 45 g 1    Cholecalciferol (VITAMIN D) 50 MCG (2000 UT) CAPS capsule Take by mouth Daily      phenytoin (DILANTIN) 100 MG ER capsule Take 6 capsules by mouth daily take 6 at night (Patient taking differently: Take 400 mg by mouth daily take 4 at night) 180 capsule 3    XARELTO 20 MG TABS tablet Take 20 mg by mouth daily       MITIGARE 0.6 MG capsule TK ONE C PO BID PRN  1    irbesartan (AVAPRO) 150 MG tablet Take 150 mg by mouth daily       NYSTATIN, TOPICAL, POWD Apply  topically.  amoxicillin-clavulanate (AUGMENTIN) 875-125 MG per tablet Take 1 tablet by mouth every 12 hours (Patient not taking: Reported on 3/22/2022)      sodium chloride (OCEAN, BABY AYR) 0.65 % nasal spray 2 sprays by Nasal route three times daily for 7 days 1 each 0     No current facility-administered medications on file prior to visit. Review of Systems   Skin: Positive for wound. All other systems reviewed and are negative. Objective   Physical Exam  Vitals and nursing note reviewed. Constitutional:       General: She is not in acute distress. Appearance: Normal appearance. HENT:      Head: Normocephalic and atraumatic. Nose: No congestion. Eyes:      General: No scleral icterus. Cardiovascular:      Rate and Rhythm: Normal rate. Heart sounds: No murmur heard. Pulmonary:      Effort: Pulmonary effort is normal. No respiratory distress. Breath sounds: Normal breath sounds. No wheezing or rales. Abdominal:      General: Abdomen is flat. There is no distension. Palpations: Abdomen is soft. There is no mass. Tenderness: There is no abdominal tenderness. Musculoskeletal:         General: No swelling or tenderness. Cervical back: No rigidity. Lymphadenopathy:      Cervical: No cervical adenopathy. Skin:     Coloration: Skin is not jaundiced. Findings: No erythema or rash. Neurological:      General: No focal deficit present. Mental Status: She is alert and oriented to person, place, and time. Cranial Nerves: No cranial nerve deficit. Motor: Weakness (.Uses a walker) present. Psychiatric:         Mood and Affect: Mood normal.         Behavior: Behavior normal.     Left buttock dry ulcer, soft eschar, no drainage, no surrounding erythema or induration, mild brown discoloration  Photo was obtained with patient's permission            0 Result Notes     2 HM Topics    Component Ref Range & Units 11/16/21 1548 10/22/21 0642 10/22/21 0642 10/22/21 0300 10/21/21 2109 10/21/21 1433 10/21/21 1017   Sodium 135 - 144 mEq/L 130 Low   131 Low   130 Low   129 Low   129 Low   128 Low   126 Low     Potassium 3.4 - 4.9 mEq/L 5.5 High   4.7  4.7  4.8  4.5  5.1 High   4.5    Chloride 95 - 107 mEq/L 96  95  96  94 Low   94 Low   91 Low   91 Low     CO2 20 - 31 mEq/L 23  22  23  22  24  25  23    Anion Gap 9 - 15 mEq/L 11  14  11  13  11  12  12    Glucose 70 - 99 mg/dL 88  108 High   107 High   122 High   96  96  94    BUN 8 - 23 mg/dL 27 High   24 High   24 High   25 High   26 High   26 High   26 High     CREATININE 0.50 - 0.90 mg/dL 1.38 High   1.16 High   1.11 High   1.13 High   1.13 High   1.16 High   1.05 High     GFR Non- >60 38.4 Low   46.9 Low  CM  49.4 Low  CM  48.4 Low  CM  48.4 Low  CM  46.9 Low  CM  52.6 Low  CM    Comment: >60 mL/min/1.73m2 EGFR, calc. for ages 25 and older using the   MDRD formula (not corrected for weight), is valid for stable   renal function.     GFR  >60 46.5 Low   56.8 Low  CM  59.7 Low  CM  58.5 Low  CM  58.5 Low  CM  56.8 Low  CM  >60.0 CM    Comment: >60 mL/min/1.73m2 EGFR, calc. for ages 25 and older using the   MDRD formula (not corrected for weight), is valid for stable renal function. Calcium 8.5 - 9.9 mg/dL 8.9  8.6  8.4 Low   8.5  9.0  8.6  8.1 Low     Total Protein    5.8 Low  R        Albumin    3.5 R        Total Bilirubin    <0.2 R        Alkaline Phosphatase    93 R        ALT    15 R        AST    17 R        Globulin    2.3            Susceptibility      Staph aureus mrsa (1)    Antibiotic Interpretation Microscan  Method Status    benzylpenicillin Resistant >=0.5 mcg/mL BACTERIAL SUSCEPTIBILITY PANEL BY NATHANIEL     clindamycin Sensitive <=0.25 mcg/mL BACTERIAL SUSCEPTIBILITY PANEL BY NATHANIEL     erythromycin Resistant >=8 mcg/mL BACTERIAL SUSCEPTIBILITY PANEL BY NATHANIEL     gentamicin Sensitive <=0.5 mcg/mL BACTERIAL SUSCEPTIBILITY PANEL BY NATHANIEL      Gentamicin is used only in combination with other active agents that   test susceptible. inducible clindamycin resistance Sensitive NEGATIVE mcg/mL BACTERIAL SUSCEPTIBILITY PANEL BY NATHANIEL     oxacillin Resistant >=4 mcg/mL BACTERIAL SUSCEPTIBILITY PANEL BY NATHANIEL     tetracycline Sensitive <=1 mcg/mL BACTERIAL SUSCEPTIBILITY PANEL BY NATHANIEL     trimethoprim-sulfamethoxazole Sensitive <=10 mcg/mL BACTERIAL SUSCEPTIBILITY PANEL BY NATHANIEL     vancomycin Sensitive 1 mcg/mL BACTERIAL SUSCEPTIBILITY          On this date 3/22/2022 I have spent 30 minutes reviewing previous notes, test results and face to face with the patient discussing the diagnosis and importance of compliance with the treatment plan as well as documenting on the day of the visit. An electronic signature was used to authenticate this note.     --Pierce Max MD

## 2022-03-28 PROBLEM — R06.09 DYSPNEA ON EXERTION: Status: ACTIVE | Noted: 2022-03-28

## 2022-03-28 PROBLEM — M10.9 GOUT: Status: ACTIVE | Noted: 2022-03-28

## 2022-03-28 PROBLEM — G40.909 EPILEPTIC SEIZURE (HCC): Status: ACTIVE | Noted: 2022-03-28

## 2022-03-28 PROBLEM — Z90.710 STATUS POST HYSTERECTOMY: Status: ACTIVE | Noted: 2022-03-28

## 2022-03-28 PROBLEM — R01.1 HEART MURMUR: Status: ACTIVE | Noted: 2022-03-28

## 2022-03-28 PROBLEM — I27.20 PULMONARY HYPERTENSION (HCC): Status: ACTIVE | Noted: 2022-03-28

## 2022-03-28 PROBLEM — N18.31 STAGE 3A CHRONIC KIDNEY DISEASE (HCC): Status: ACTIVE | Noted: 2022-03-28

## 2022-03-28 PROBLEM — I89.0 LYMPHEDEMA: Status: ACTIVE | Noted: 2022-03-28

## 2022-03-28 PROBLEM — E55.9 VITAMIN D DEFICIENCY: Status: ACTIVE | Noted: 2022-03-28

## 2022-03-28 PROBLEM — I10 HYPERTENSION: Status: ACTIVE | Noted: 2022-03-28

## 2022-03-30 ENCOUNTER — OFFICE VISIT (OUTPATIENT)
Dept: NEUROLOGY | Age: 67
End: 2022-03-30
Payer: MEDICARE

## 2022-03-30 VITALS
DIASTOLIC BLOOD PRESSURE: 80 MMHG | BODY MASS INDEX: 54.13 KG/M2 | SYSTOLIC BLOOD PRESSURE: 132 MMHG | WEIGHT: 293 LBS | HEART RATE: 68 BPM

## 2022-03-30 DIAGNOSIS — G40.309 NONINTRACTABLE GENERALIZED IDIOPATHIC EPILEPSY WITHOUT STATUS EPILEPTICUS (HCC): ICD-10-CM

## 2022-03-30 DIAGNOSIS — R27.0 ATAXIA: ICD-10-CM

## 2022-03-30 DIAGNOSIS — I63.512 CEREBROVASCULAR ACCIDENT (CVA) DUE TO STENOSIS OF LEFT MIDDLE CEREBRAL ARTERY (HCC): ICD-10-CM

## 2022-03-30 DIAGNOSIS — G40.309 GENERALIZED CONVULSIVE EPILEPSY (HCC): Primary | ICD-10-CM

## 2022-03-30 DIAGNOSIS — G40.309 GENERALIZED CONVULSIVE EPILEPSY (HCC): ICD-10-CM

## 2022-03-30 DIAGNOSIS — G93.89 ENCEPHALOMALACIA: ICD-10-CM

## 2022-03-30 LAB
PHENOBARBITAL LEVEL: 11.5 UG/ML (ref 10–30)
PHENYTOIN LEVEL: 7.2 UG/ML (ref 10–20)

## 2022-03-30 PROCEDURE — G8400 PT W/DXA NO RESULTS DOC: HCPCS | Performed by: PSYCHIATRY & NEUROLOGY

## 2022-03-30 PROCEDURE — G8427 DOCREV CUR MEDS BY ELIG CLIN: HCPCS | Performed by: PSYCHIATRY & NEUROLOGY

## 2022-03-30 PROCEDURE — G8417 CALC BMI ABV UP PARAM F/U: HCPCS | Performed by: PSYCHIATRY & NEUROLOGY

## 2022-03-30 PROCEDURE — 1036F TOBACCO NON-USER: CPT | Performed by: PSYCHIATRY & NEUROLOGY

## 2022-03-30 PROCEDURE — G8482 FLU IMMUNIZE ORDER/ADMIN: HCPCS | Performed by: PSYCHIATRY & NEUROLOGY

## 2022-03-30 PROCEDURE — 3017F COLORECTAL CA SCREEN DOC REV: CPT | Performed by: PSYCHIATRY & NEUROLOGY

## 2022-03-30 PROCEDURE — 1090F PRES/ABSN URINE INCON ASSESS: CPT | Performed by: PSYCHIATRY & NEUROLOGY

## 2022-03-30 PROCEDURE — 99214 OFFICE O/P EST MOD 30 MIN: CPT | Performed by: PSYCHIATRY & NEUROLOGY

## 2022-03-30 PROCEDURE — 1123F ACP DISCUSS/DSCN MKR DOCD: CPT | Performed by: PSYCHIATRY & NEUROLOGY

## 2022-03-30 PROCEDURE — 4040F PNEUMOC VAC/ADMIN/RCVD: CPT | Performed by: PSYCHIATRY & NEUROLOGY

## 2022-03-30 RX ORDER — PHENOBARBITAL 64.8 MG/1
64.8 TABLET ORAL 2 TIMES DAILY
Qty: 180 TABLET | Refills: 0 | Status: SHIPPED | OUTPATIENT
Start: 2022-03-30 | End: 2022-09-06

## 2022-03-30 RX ORDER — INDAPAMIDE 1.25 MG/1
1.25 TABLET, FILM COATED ORAL DAILY
COMMUNITY
Start: 2022-02-16

## 2022-03-30 ASSESSMENT — ENCOUNTER SYMPTOMS
NAUSEA: 0
VOMITING: 0
SHORTNESS OF BREATH: 0
COLOR CHANGE: 0
CHOKING: 0
PHOTOPHOBIA: 0
TROUBLE SWALLOWING: 0
BACK PAIN: 0

## 2022-03-30 NOTE — PROGRESS NOTES
Subjective:      Patient ID: Edith Herman is a 74 y.o. female who presents today for:  Chief Complaint   Patient presents with    Follow-up     pt has not had any recent seizures. no concerns at this time. HPI 74 right-handed female with a history of generalized epilepsy. Patient has been on phenobarbital many years. She takes still 5.8 mg twice a day. Patient also is on Dilantin 600 mg daily. As I see she is  taking 400 mg. Now is on 600 mg she had high levels. Last phenytoin levels are not done. Patient also appears history of a stroke in the supple stroke seizures with a cerebral infarction as well as encephalomalacia. No further TIAs have occurred. She also has sleep apnea syndrome and uses a CPAP machine. Patient has not declined. Last generalized seizure documented 1. She is doing well otherwise she has not declined. She has not had any major cognitive issues either. Occasionally she had some feeling of dizziness which is positional and not related to seizures. She has fatigue which actually did improve after decreasing her phenytoin. This laceration would further obtain a carotid ultrasound which are reviewed and appears to be normal without stenosis  Patient does not use a CPAP machine as much as she likes to use it as she falls off asleep in the recliner. She lives alone and is independent she is supposed  Past Medical History:   Diagnosis Date    Atrial fibrillation ([address] 12/9/2014    Carotid stenosis, bilateral     Cerebral infarction due to embolism ([address]     Endometrial cancer ([address] 3/16/2015    Family history of premature CAD 12/9/2014    Generalized convulsive epilepsy ([address] 3/16/2015    Hyperlipidemia     Hypertension 3/28/2022    Idiopathic generalized epilepsy ([address]     Obesity     Occipital infarction ([address] 12/9/2014    Stage 3a chronic kidney disease ([address] 3/28/2022     No past surgical history on file.   Social History     Socioeconomic History    Marital status: Single     Spouse name: Not on file    Number of children: Not on file    Years of education: Not on file    Highest education level: Not on file   Occupational History    Not on file   Tobacco Use    Smoking status: Never Smoker    Smokeless tobacco: Never Used   Substance and Sexual Activity    Alcohol use: No    Drug use: No    Sexual activity: Not on file   Other Topics Concern    Not on file   Social History Narrative    Not on file     Social Determinants of Health     Financial Resource Strain:     Difficulty of Paying Living Expenses: Not on file   Food Insecurity:     Worried About Running Out of Food in the Last Year: Not on file    James of Food in the Last Year: Not on file   Transportation Needs:     Lack of Transportation (Medical): Not on file    Lack of Transportation (Non-Medical): Not on file   Physical Activity:     Days of Exercise per Week: Not on file    Minutes of Exercise per Session: Not on file   Stress:     Feeling of Stress : Not on file   Social Connections:     Frequency of Communication with Friends and Family: Not on file    Frequency of Social Gatherings with Friends and Family: Not on file    Attends [employer] Services: Not on file    Active Member of [address] [place] [address] [employer] or Organizations: Not on file    Attends Club or Organization Meetings: Not on file    Marital Status: Not on file   Intimate Partner Violence:     Fear of Current or Ex-Partner: Not on file    Emotionally Abused: Not on file    Physically Abused: Not on file    Sexually Abused: Not on file   Housing Stability:     Unable to Pay for Housing in the Last Year: Not on file    Number of [place] in the Last Year: Not on file    Unstable Housing in the Last Year: Not on file     Family History   Family history unknown:  Yes     Allergies   Allergen Reactions    Diltiazem Other (See Comments)     Causes low pulse rate    Other      Other reaction(s): rash    Adhesive Tape Rash When skin is irritated        Current Outpatient Medications   Medication Sig Dispense Refill    indapamide (LOZOL) 1.25 MG tablet Take 1.25 mg by mouth daily      PHENobarbital (LUMINAL) 64.8 MG tablet Take 1 tablet by mouth 2 times daily for 90 days. 180 tablet 0    doxycycline hyclate (VIBRA-TABS) 100 MG tablet Take 1 tablet by mouth 2 times daily for 10 days 20 tablet 0    metoprolol succinate (TOPROL XL) 25 MG extended release tablet Take 1 tablet by mouth daily 30 tablet 3    sodium chloride (OCEAN, BABY AYR) 0.65 % nasal spray 2 sprays by Nasal route three times daily for 7 days 1 each 0    Cholecalciferol (VITAMIN D) 50 MCG (2000 UT) CAPS capsule Take by mouth Daily      phenytoin (DILANTIN) 100 MG ER capsule Take 6 capsules by mouth daily take 6 at night (Patient taking differently: Take 400 mg by mouth daily take 4 at night) 180 capsule 3    XARELTO 20 MG TABS tablet Take 20 mg by mouth daily       MITIGARE 0.6 MG capsule TK ONE C PO BID PRN  1    irbesartan (AVAPRO) 150 MG tablet Take 150 mg by mouth daily       NYSTATIN, TOPICAL, POWD Apply  topically.  amoxicillin-clavulanate (AUGMENTIN) 875-125 MG per tablet Take 1 tablet by mouth every 12 hours (Patient not taking: Reported on 3/22/2022)      chlorhexidine (HIBICLENS) 4 % external liquid Apply topically daily as needed for 10 days (Patient not taking: Reported on 3/30/2022) 946 mL 5    mupirocin (BACTROBAN) 2 % nasal ointment Take by Nasal route 2 times daily. (Patient not taking: Reported on 3/30/2022) 1 g 5    miconazole (MICOTIN) 2 % powder Apply topically 2 times daily. (Patient not taking: Reported on 3/30/2022) 45 g 1     No current facility-administered medications for this visit. Review of Systems   Constitutional: Negative for fever. HENT: Negative for ear pain, tinnitus and trouble swallowing. Eyes: Negative for photophobia and visual disturbance. Respiratory: Negative for choking and shortness of breath. Cardiovascular: Negative for chest pain and palpitations. Gastrointestinal: Negative for nausea and vomiting. Musculoskeletal: Negative for back pain, gait problem, joint swelling, myalgias, neck pain and neck stiffness. Skin: Negative for color change. Allergic/Immunologic: Negative for food allergies. Neurological: Negative for dizziness, tremors, seizures, syncope, facial asymmetry, speech difficulty, weakness, light-headedness, numbness and headaches. Psychiatric/Behavioral: Negative for behavioral problems, confusion, hallucinations and sleep disturbance. Objective:   /80 (Site: Left Upper Arm, Position: Sitting, Cuff Size: Medium Adult)   Pulse 68   Wt (!) 325 lb 4.8 oz (147.6 kg)   BMI 54.13 kg/m²     Physical Exam  Vitals reviewed. Eyes:      Pupils: Pupils are equal, round, and reactive to light. Cardiovascular:      Rate and Rhythm: Normal rate and regular rhythm. Heart sounds: No murmur heard. Pulmonary:      Effort: Pulmonary effort is normal.      Breath sounds: Normal breath sounds. Abdominal:      General: Bowel sounds are normal.   Musculoskeletal:         General: Normal range of motion. Cervical back: Normal range of motion. Skin:     General: Skin is warm. Neurological:      Mental Status: She is alert and oriented to person, place, and time. Cranial Nerves: No cranial nerve deficit. Sensory: No sensory deficit. Motor: No abnormal muscle tone. Coordination: Coordination normal.      Deep Tendon Reflexes: Reflexes are normal and symmetric. Babinski sign absent on the right side. Babinski sign absent on the left side. Psychiatric:         Mood and Affect: Mood normal.     Patient is morbidly obese and she is mildly weak in the left compared to the right and she has ataxia and areflexia in the lower extremity. No tremors are noted.     US CAROTID ARTERY BILATERAL    Result Date: 11/12/2021  EXAMINATION: US CAROTID ARTERY BILATERAL DATE AND TIME:11/11/2021 3:43 PM CLINICAL HISTORY: Carotid artery stenosis  G45.9 TIA (transient ischemic attack) ICD10 COMPARISON:None TECHNIQUE: Carotid duplex sonograms which include gray scale and color flow evaluation are complimented with spectral waveform analysis. Please refer to chart below for specific carotid velocity measurements. The degree of stenosis recorded on this exam uses the same method of stratification used in the NASCET trials. This complies with ACR practice guidelines and the Society of radiology in ultrasound consensus statement and provides adequate information for clinical decision making. Society of Radiologists in Ultrasound (SRU) consensus statement was used to estimate internal carotid artery stenosis. FINDINGS: There is no significant plaque identified within the internal carotid arteries bilaterally. No significant increase in systolic flow or turbulence to indicate any hemodynamically significant narrowing in the right or left internal carotid arteries. There is antegrade flow identified in both vertebral arteries.  ARTERIAL BLOOD FLOW VELOCITY RIGHT PS                                               Prox CCA    175 cm/s             Mid CCA     78 cm/s              Dist CCA    72 cm/s              Prox ICA    55 cm/s               Mid ICA     80 cm/s            Dist ICA    82 cm/s             Prox ECA    127 cm/s             Prox VERT   57 cm/s              ICA/CCA     1.1                        LEFT PS Prox CCA    92 cm/s Mid CCA     91 cm/s Dist CCA    91 cm/s Prox ICA    78 cm/s Mid ICA     82 cm/s Dist ICA    96 cm/s Prox ECA    94 cm/s Prox VERT   47 cm/s ICA/CCA     1.1      <50  % STENOSIS IN THE RIGHT ICA  <50 % STENOSIS IN THE LEFT ICA       Lab Results   Component Value Date    WBC 6.9 10/22/2021    RBC 3.35 10/22/2021    HGB 11.0 10/22/2021    HCT 31.0 10/22/2021    MCV 92.5 10/22/2021    MCH 32.9 10/22/2021    MCHC 35.5 10/22/2021    RDW 13.8 10/22/2021  10/22/2021    MPV 8.4 10/28/2014     Lab Results   Component Value Date     11/16/2021    K 5.5 11/16/2021    CL 96 11/16/2021    CO2 23 11/16/2021    BUN 27 11/16/2021    CREATININE 1.38 11/16/2021    GFRAA 46.5 11/16/2021    LABGLOM 38.4 11/16/2021    GLUCOSE 88 11/16/2021    PROT 5.8 10/22/2021    LABALBU 3.5 10/22/2021    CALCIUM 8.9 11/16/2021    BILITOT <0.2 10/22/2021    ALKPHOS 93 10/22/2021    AST 17 10/22/2021    ALT 15 10/22/2021     Lab Results   Component Value Date    PROTIME 10.9 03/01/2016    INR 1.0 03/01/2016     Lab Results   Component Value Date    TSH 1.820 10/20/2021     Lab Results   Component Value Date    TRIG 152 03/01/2016    HDL 45 03/01/2016    LDLCALC 150 03/01/2016     No results found for: Erleen Peaches, LABBENZ, CANNAB, COCAINESCRN, LABMETH, OPIATESCREENURINE, PHENCYCLIDINESCREENURINE, PPXUR, ETOH  No results found for: LITHIUM, DILFRTOT, VALPROATE    Assessment:       Diagnosis Orders   1. Generalized convulsive epilepsy (HCC)  PHENobarbital (LUMINAL) 64.8 MG tablet    Phenobarbital Level    Phenytoin Level, Total   2. Encephalomalacia     3. Ataxia     4. Cerebrovascular accident (CVA) due to stenosis of left middle cerebral artery ([facility]     5. Body mass index (BMI) 50.0-59.9, adult (HCC)     Generalized seizures secondary to post stroke seizures. Patient has an encephalomalacia in the left temporal lobe. Patient has not had recurrent seizures since about 1978. We will continue on a combination of phenytoin and phenobarbital.  She was a higher dose of phenytoin and we decreased this to 400 mg she is not any seizures. Recommend that she obtain blood test.  Patient has ataxia from her underlying cerebrovascular disease but has not any falls. She is not any major cognitive issues that she lives alone is still independent. Her last carotid ultrasound just reviewed did not show any significant stenosis.   Patient has sleep apnea syndrome and uses a CPAP machine only on some days. Recommended that she continue to be somewhat more compliant with this otherwise is likely to cause seizures. We will follow her in a few months      Plan:      Orders Placed This Encounter   Procedures    Phenobarbital Level     Standing Status:   Future     Standing Expiration Date:   3/30/2023     Order Specific Question:   Dose Schedule & Time of Last Dose? Answer:   none    Phenytoin Level, Total     Standing Status:   Future     Standing Expiration Date:   3/30/2023     Order Specific Question:   Dose Schedule & Time of Last Dose? Answer:   morning dose     Orders Placed This Encounter   Medications    PHENobarbital (LUMINAL) 64.8 MG tablet     Sig: Take 1 tablet by mouth 2 times daily for 90 days. Dispense:  180 tablet     Refill:  0       No follow-ups on file.       Chante Umanzor MD

## 2022-04-18 ENCOUNTER — OFFICE VISIT (OUTPATIENT)
Dept: INFECTIOUS DISEASES | Age: 67
End: 2022-04-18
Payer: MEDICARE

## 2022-04-18 VITALS
BODY MASS INDEX: 48.82 KG/M2 | HEART RATE: 58 BPM | TEMPERATURE: 97.4 F | DIASTOLIC BLOOD PRESSURE: 82 MMHG | HEIGHT: 65 IN | RESPIRATION RATE: 20 BRPM | WEIGHT: 293 LBS | SYSTOLIC BLOOD PRESSURE: 160 MMHG

## 2022-04-18 DIAGNOSIS — A49.02 MRSA (METHICILLIN RESISTANT STAPHYLOCOCCUS AUREUS) INFECTION: ICD-10-CM

## 2022-04-18 DIAGNOSIS — L02.31 LEFT BUTTOCK ABSCESS: Primary | ICD-10-CM

## 2022-04-18 PROCEDURE — 1090F PRES/ABSN URINE INCON ASSESS: CPT | Performed by: INTERNAL MEDICINE

## 2022-04-18 PROCEDURE — 1123F ACP DISCUSS/DSCN MKR DOCD: CPT | Performed by: INTERNAL MEDICINE

## 2022-04-18 PROCEDURE — 1036F TOBACCO NON-USER: CPT | Performed by: INTERNAL MEDICINE

## 2022-04-18 PROCEDURE — 3017F COLORECTAL CA SCREEN DOC REV: CPT | Performed by: INTERNAL MEDICINE

## 2022-04-18 PROCEDURE — 4040F PNEUMOC VAC/ADMIN/RCVD: CPT | Performed by: INTERNAL MEDICINE

## 2022-04-18 PROCEDURE — G8417 CALC BMI ABV UP PARAM F/U: HCPCS | Performed by: INTERNAL MEDICINE

## 2022-04-18 PROCEDURE — G8400 PT W/DXA NO RESULTS DOC: HCPCS | Performed by: INTERNAL MEDICINE

## 2022-04-18 PROCEDURE — 99213 OFFICE O/P EST LOW 20 MIN: CPT | Performed by: INTERNAL MEDICINE

## 2022-04-18 PROCEDURE — G8427 DOCREV CUR MEDS BY ELIG CLIN: HCPCS | Performed by: INTERNAL MEDICINE

## 2022-04-18 NOTE — PROGRESS NOTES
Michele Lozano (:  1955) is a 74 y.o. female,Established patient, here for evaluation of the following chief complaint(s):  Abscess (2 week f/u-mrsa abscess of left buttock)         ASSESSMENT/PLAN:  Left buttock abscess  MRSA infection    Continue local wound cares with Bactroban ointment  Continue Hibiclens body wash from neck down 3 times weekly  Follow-up as needed  Patient was instructed to call me back if there is any worsening symptoms or if lack of healing  Continue offloading    SUBJECTIVE/OBJECTIVE:  HPI  Follow-up left buttock abscess with MRSA infection, healing wound. Mild itching. No drainage or pain. No fevers  Review of Systems   All other systems reviewed and are negative. Physical Exam  Vitals and nursing note reviewed. Constitutional:       General: She is not in acute distress. Appearance: Normal appearance. HENT:      Head: Normocephalic. Nose: No congestion. Eyes:      General: No scleral icterus. Pulmonary:      Effort: Pulmonary effort is normal. No respiratory distress. Abdominal:      General: There is no distension (.  Large pannus). Musculoskeletal:         General: No swelling. Skin:     Coloration: Skin is not jaundiced. Neurological:      Mental Status: She is alert and oriented to person, place, and time. Psychiatric:         Mood and Affect: Mood normal.         Behavior: Behavior normal.     Left buttock with progressive wound healing, 2 cm in diameter, positive surrounding brown discoloration, no drainage or induration       On this date 2022 I have spent 20 minutes reviewing previous notes, test results and face to face with the patient discussing the diagnosis and importance of compliance with the treatment plan as well as documenting on the day of the visit. An electronic signature was used to authenticate this note.     --Kristie Crump MD

## 2022-09-04 DIAGNOSIS — G40.309 GENERALIZED CONVULSIVE EPILEPSY (HCC): ICD-10-CM

## 2022-09-06 RX ORDER — PHENOBARBITAL 64.8 MG/1
TABLET ORAL
Qty: 60 TABLET | Refills: 0 | Status: SHIPPED | OUTPATIENT
Start: 2022-09-06 | End: 2022-09-28 | Stop reason: SDUPTHER

## 2022-09-06 NOTE — TELEPHONE ENCOUNTER
Pharmacy is requesting medication refill.  Please approve or deny this request.    Rx requested:  Requested Prescriptions     Pending Prescriptions Disp Refills    PHENobarbital (LUMINAL) 64.8 MG tablet [Pharmacy Med Name: PHENOBARBITAL  64.8MG  TAB] 180 tablet 0     Sig: TAKE 1 TABLET BY MOUTH  TWICE DAILY         Last Office Visit:   3/30/2022      Next Visit Date:  Future Appointments   Date Time Provider Ena Lundberg   9/28/2022  2:45 PM Yari Marin MD [facility] Simple / Intermediate / Complex Repair - Final Wound Length In Cm: 4.2

## 2022-09-21 PROBLEM — R27.0 ATAXIA: Status: ACTIVE | Noted: 2022-09-21

## 2022-09-21 PROBLEM — I63.512 CEREBROVASCULAR ACCIDENT (CVA) DUE TO STENOSIS OF LEFT MIDDLE CEREBRAL ARTERY (HCC): Status: ACTIVE | Noted: 2022-09-21

## 2022-09-28 ENCOUNTER — OFFICE VISIT (OUTPATIENT)
Dept: NEUROLOGY | Age: 67
End: 2022-09-28
Payer: MEDICARE

## 2022-09-28 VITALS
HEART RATE: 67 BPM | DIASTOLIC BLOOD PRESSURE: 84 MMHG | BODY MASS INDEX: 57.43 KG/M2 | WEIGHT: 293 LBS | SYSTOLIC BLOOD PRESSURE: 124 MMHG

## 2022-09-28 DIAGNOSIS — G47.51 CONFUSIONAL AROUSALS: ICD-10-CM

## 2022-09-28 DIAGNOSIS — G40.309 NONINTRACTABLE GENERALIZED IDIOPATHIC EPILEPSY WITHOUT STATUS EPILEPTICUS (HCC): Primary | ICD-10-CM

## 2022-09-28 DIAGNOSIS — G40.309 GENERALIZED CONVULSIVE EPILEPSY (HCC): ICD-10-CM

## 2022-09-28 DIAGNOSIS — G47.10 HYPERSOMNOLENCE: ICD-10-CM

## 2022-09-28 PROCEDURE — 1090F PRES/ABSN URINE INCON ASSESS: CPT | Performed by: PSYCHIATRY & NEUROLOGY

## 2022-09-28 PROCEDURE — G8400 PT W/DXA NO RESULTS DOC: HCPCS | Performed by: PSYCHIATRY & NEUROLOGY

## 2022-09-28 PROCEDURE — 99214 OFFICE O/P EST MOD 30 MIN: CPT | Performed by: PSYCHIATRY & NEUROLOGY

## 2022-09-28 PROCEDURE — 1036F TOBACCO NON-USER: CPT | Performed by: PSYCHIATRY & NEUROLOGY

## 2022-09-28 PROCEDURE — G8417 CALC BMI ABV UP PARAM F/U: HCPCS | Performed by: PSYCHIATRY & NEUROLOGY

## 2022-09-28 PROCEDURE — 3017F COLORECTAL CA SCREEN DOC REV: CPT | Performed by: PSYCHIATRY & NEUROLOGY

## 2022-09-28 PROCEDURE — G8427 DOCREV CUR MEDS BY ELIG CLIN: HCPCS | Performed by: PSYCHIATRY & NEUROLOGY

## 2022-09-28 PROCEDURE — 1123F ACP DISCUSS/DSCN MKR DOCD: CPT | Performed by: PSYCHIATRY & NEUROLOGY

## 2022-09-28 RX ORDER — PHENOBARBITAL 64.8 MG/1
TABLET ORAL
Qty: 180 TABLET | Refills: 1 | Status: SHIPPED | OUTPATIENT
Start: 2022-09-28 | End: 2022-10-27

## 2022-09-28 RX ORDER — PHENYTOIN SODIUM 100 MG/1
400 CAPSULE, EXTENDED RELEASE ORAL NIGHTLY
COMMUNITY

## 2022-09-28 ASSESSMENT — ENCOUNTER SYMPTOMS
TROUBLE SWALLOWING: 0
SHORTNESS OF BREATH: 0
NAUSEA: 0
PHOTOPHOBIA: 0
VOMITING: 0
COLOR CHANGE: 0
CHOKING: 0
BACK PAIN: 0

## 2022-09-28 NOTE — PROGRESS NOTES
Subjective:      Patient ID: Noa Norton is a 74 y.o. female who presents today for:  Chief Complaint   Patient presents with    6 Month Follow-Up     Pt states no recent seizures. No concerns at this time. 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 Patient is on phenobarbital and phenytoin and we still await her sleep study. She has CVA in the past.  Her phenytoin levels are 7.2 and they do fluctuate. We therefore continued her on phenobarbital is likely that one of them is interacting with the other. Given that she has not had any seizures we had not recommended increasing this either. Is already on 600 mg of Dilantin and she has not taken seizures since 1978 had further decrease the phenobarbital to 400 mg given that she has not any seizures as at 600 she was quite high which is the linear curve. 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 She has any new cognitive changes    Past Medical History:   Diagnosis Date    Atrial [facility] ([facility] 12/9/2014    Carotid stenosis, bilateral     Cerebral infarction due to embolism [facility])     Endometrial cancer ([facility] 3/16/2015    Family history of premature CAD 12/9/2014    Generalized convulsive epilepsy ([facility] 3/16/2015    Hyperlipidemia     Hypertension 3/28/2022    Idiopathic generalized epilepsy ([facility]     Obesity     Occipital infarction ([facility] 12/9/2014    Stage 3a chronic kidney disease ([facility] 3/28/2022     No past surgical history on file.   Social History     Socioeconomic History    Marital status: Single     Spouse name: Not on file    Number of children: Not on file    Years of education: Not on file    Highest education level: Not on file   Occupational History    Not on file   Tobacco Use    Smoking status: Never    Smokeless tobacco: Never   Substance and Sexual Activity    Alcohol use: No    Drug use: No    Sexual activity: Not on file Other Topics Concern    Not on file   Social History Narrative    Not on file     Social Determinants of Health     Financial Resource Strain: Not on file   Food Insecurity: Not on file   Transportation Needs: Not on file   Physical Activity: Not on file   Stress: Not on file   Social Connections: Not on file   Intimate Partner Violence: Not on file   Housing Stability: Not on file     Family History   Family history unknown: Yes     Allergies   Allergen Reactions    Diltiazem Other (See Comments)     Causes low pulse rate    Other      Other reaction(s): rash    Adhesive Tape Rash     When skin is irritated        Current Outpatient Medications   Medication Sig Dispense Refill    phenytoin (DILANTIN) 100 MG ER capsule Take 400 mg by mouth at bedtime      PHENobarbital (LUMINAL) 64.8 MG tablet TAKE 1 TABLET BY MOUTH  TWICE DAILY 180 tablet 1    indapamide (LOZOL) 1.25 MG tablet Take 1.25 mg by mouth daily      chlorhexidine (HIBICLENS) 4 % external liquid Apply topically daily as needed for 10 days 946 mL 5    mupirocin (BACTROBAN) 2 % nasal ointment Take by Nasal route 2 times daily. 1 g 5    metoprolol succinate (TOPROL XL) 25 MG extended release tablet Take 1 tablet by mouth daily 30 tablet 3    sodium chloride (OCEAN, BABY AYR) 0.65 % nasal spray 2 sprays by Nasal route three times daily for 7 days 1 each 0    Cholecalciferol (VITAMIN D) 50 MCG (2000 UT) CAPS capsule Take by mouth Daily      XARELTO 20 MG TABS tablet Take 20 mg by mouth daily       MITIGARE 0.6 MG capsule TK ONE C PO BID PRN  1    irbesartan (AVAPRO) 150 MG tablet Take 150 mg by mouth daily       NYSTATIN, TOPICAL, POWD Apply  topically. miconazole (MICOTIN) 2 % powder Apply topically 2 times daily. (Patient not taking: Reported on 9/28/2022) 45 g 1     No current facility-administered medications for this visit. Review of Systems   Constitutional:  Negative for fever.    HENT:  Negative for ear pain, tinnitus and trouble swallowing. Eyes:  Negative for photophobia and visual disturbance. Respiratory:  Negative for choking and shortness of breath. Cardiovascular:  Negative for chest pain and palpitations. Gastrointestinal:  Negative for nausea and vomiting. Musculoskeletal:  Negative for back pain, gait problem, joint swelling, myalgias, neck pain and neck stiffness. Skin:  Negative for color change. Allergic/Immunologic: Negative for food allergies. Neurological:  Negative for dizziness, tremors, seizures, syncope, facial asymmetry, speech difficulty, weakness, light-headedness, numbness and headaches. Psychiatric/Behavioral:  Negative for behavioral problems, confusion, hallucinations and sleep disturbance. Objective:   /84 (Site: Left Lower Arm, Position: Sitting, Cuff Size: Medium Adult)   Pulse 67   Wt (!) 345 lb 1.6 oz (156.5 kg)   BMI 57.43 kg/m²     Physical Exam  Vitals reviewed. Eyes:      Pupils: Pupils are equal, round, and reactive to light. Cardiovascular:      Rate and Rhythm: Normal rate and regular rhythm. Heart sounds: No murmur heard. Pulmonary:      Effort: Pulmonary effort is normal.      Breath sounds: Normal breath sounds. Abdominal:      General: Bowel sounds are normal.   Musculoskeletal:         General: Normal range of motion. Cervical back: Normal range of motion. Skin:     General: Skin is warm. Neurological:      Mental Status: She is alert and oriented to person, place, and time. Cranial Nerves: No cranial nerve deficit. Sensory: No sensory deficit. Motor: No abnormal muscle tone. Coordination: Coordination normal.      Deep Tendon Reflexes: Reflexes are normal and symmetric. Babinski sign absent on the right side. Babinski sign absent on the left side.    Psychiatric:         Mood and Affect: Mood normal.       US CAROTID ARTERY BILATERAL    Result Date: 11/12/2021  EXAMINATION: US CAROTID ARTERY BILATERAL DATE AND TIME:11/11/2021 3:43 PM CLINICAL HISTORY: Carotid artery stenosis  G45.9 TIA (transient ischemic attack) ICD10 COMPARISON:None TECHNIQUE: Carotid duplex sonograms which include gray scale and color flow evaluation are complimented with spectral waveform analysis. Please refer to chart below for specific carotid velocity measurements. The degree of stenosis recorded on this exam uses the same method of stratification used in the NASCET trials. This complies with ACR practice guidelines and the Society of radiology in ultrasound consensus statement and provides adequate information for clinical decision making. Society of Radiologists in Ultrasound (SRU) consensus statement was used to estimate internal carotid artery stenosis. FINDINGS: There is no significant plaque identified within the internal carotid arteries bilaterally. No significant increase in systolic flow or turbulence to indicate any hemodynamically significant narrowing in the right or left internal carotid arteries. There is antegrade flow identified in both vertebral arteries.  ARTERIAL BLOOD FLOW VELOCITY RIGHT PS                                               Prox CCA    175 cm/s             Mid CCA     78 cm/s              Dist CCA    72 cm/s              Prox ICA    55 cm/s               Mid ICA     80 cm/s            Dist ICA    82 cm/s             Prox ECA    127 cm/s             Prox VERT   57 cm/s              ICA/CCA     1.1                        LEFT PS Prox CCA    92 cm/s Mid CCA     91 cm/s Dist CCA    91 cm/s Prox ICA    78 cm/s Mid ICA     82 cm/s Dist ICA    96 cm/s Prox ECA    94 cm/s Prox VERT   47 cm/s ICA/CCA     1.1      <50  % STENOSIS IN THE RIGHT ICA  <50 % STENOSIS IN THE LEFT ICA       Lab Results   Component Value Date/Time    WBC 6.9 10/22/2021 06:42 AM    RBC 3.35 10/22/2021 06:42 AM    HGB 11.0 10/22/2021 06:42 AM    HCT 31.0 10/22/2021 06:42 AM    MCV 92.5 10/22/2021 06:42 AM    MCH 32.9 10/22/2021 06:42 AM    MCHC 35.5 10/22/2021 06:42 AM    RDW 13.8 10/22/2021 06:42 AM     10/22/2021 06:42 AM    MPV 8.4 10/28/2014 04:15 PM     Lab Results   Component Value Date/Time     11/16/2021 03:48 PM    K 5.5 11/16/2021 03:48 PM    CL 96 11/16/2021 03:48 PM    CO2 23 11/16/2021 03:48 PM    BUN 27 11/16/2021 03:48 PM    CREATININE 1.38 11/16/2021 03:48 PM    GFRAA 46.5 11/16/2021 03:48 PM    LABGLOM 38.4 11/16/2021 03:48 PM    GLUCOSE 88 11/16/2021 03:48 PM    PROT 5.8 10/22/2021 06:42 AM    LABALBU 3.5 10/22/2021 06:42 AM    CALCIUM 8.9 11/16/2021 03:48 PM    BILITOT <0.2 10/22/2021 06:42 AM    ALKPHOS 93 10/22/2021 06:42 AM    AST 17 10/22/2021 06:42 AM    ALT 15 10/22/2021 06:42 AM     Lab Results   Component Value Date/Time    PROTIME 10.9 03/01/2016 04:25 PM    INR 1.0 03/01/2016 04:25 PM     Lab Results   Component Value Date/Time    TSH 1.820 10/20/2021 08:04 AM     Lab Results   Component Value Date/Time    TRIG 152 03/01/2016 04:25 PM    HDL 45 03/01/2016 04:25 PM    LDLCALC 150 03/01/2016 04:25 PM     No results found for: Jeppie Quill, LABBENZ, CANNAB, COCAINESCRN, LABMETH, OPIATESCREENURINE, PHENCYCLIDINESCREENURINE, PPXUR, ETOH  No results found for: LITHIUM, DILFRTOT, VALPROATE    Assessment:       Diagnosis Orders   1. Nonintractable generalized idiopathic epilepsy without status epilepticus ([facility]        2. Generalized convulsive epilepsy ([facility]  PHENobarbital (LUMINAL) 64.8 MG tablet    Baseline Diagnostic Sleep Study      3. Confusional arousals   Baseline Diagnostic Sleep Study      4. Hypersomnolence        72-year-old right-handed female with a known history of generalized epilepsy since 1. Patient is encephalomalacia from previous stroke. Patient has no seizures for many years we are battling her medications for a while. She was on Dilantin 60 mg with second elevated levels.   We then brought it down to 40 mg and she has lower levels which are reasonable given that she is already on phenobarbital as well. This is a linear curve an interaction between each medication and therefore difficult to manage. Given that she is not any seizures we would not want to rock the boat. Her main issues appears to be hypersomnolence and tiredness. We recommended a sleep study which has not been done. Recommend that she do this as this might be causing some most issues. We will keep an eye on this and continue to follow. She has not developed any cognitive issues secondary to phenobarbital either. Plan:      Orders Placed This Encounter   Procedures    Baseline Diagnostic Sleep Study     Patients with abnormal results will be assessed and referred to the sleep  as needed. Standing Status:   Future     Standing Expiration Date:   9/28/2023     Scheduling Instructions:      Scheduling and Pre-Certification: [phone]      The following must be completed and FAXED to [phone]      1) Sleep Evaluation Orders Form      2) Office note justifying study      3) Demographic info / insurance card     Order Specific Question:   Adult or Pediatric     Answer:   Adult Study (>7 Years)     Order Specific Question:   Location For Sleep Study     Answer:   [place]     Order Specific Question:   Select Sleep Lab Location     Answer:   [facility]     Comments:   [place]     Orders Placed This Encounter   Medications    PHENobarbital (LUMINAL) 64.8 MG tablet     Sig: TAKE 1 TABLET BY MOUTH  TWICE DAILY     Dispense:  180 tablet     Refill:  1       Return in about 6 months (around 3/28/2023).       Andres Bustos MD

## 2022-11-06 ENCOUNTER — HOSPITAL ENCOUNTER (OUTPATIENT)
Dept: SLEEP CENTER | Age: 67
Discharge: HOME | End: 2022-11-08
Payer: MEDICARE

## 2022-11-06 PROCEDURE — 95810 POLYSOM 6/> YRS 4/> PARAM: CPT

## 2022-11-10 LAB
ANION GAP SERPL CALCULATED.3IONS-SCNC: 12 MEQ/L (ref 9–15)
BUN BLDV-MCNC: 23 MG/DL (ref 8–23)
CALCIUM SERPL-MCNC: 8.9 MG/DL (ref 8.5–9.9)
CHLORIDE BLD-SCNC: 104 MEQ/L (ref 95–107)
CO2: 26 MEQ/L (ref 20–31)
CREAT SERPL-MCNC: 1.2 MG/DL (ref 0.5–0.9)
GFR SERPL CREATININE-BSD FRML MDRD: 50 ML/MIN/{1.73_M2}
GLUCOSE BLD-MCNC: 84 MG/DL (ref 70–99)
POTASSIUM SERPL-SCNC: 4.2 MEQ/L (ref 3.4–4.9)
SODIUM BLD-SCNC: 142 MEQ/L (ref 135–144)

## 2022-12-19 DIAGNOSIS — G47.51 CONFUSIONAL AROUSALS: ICD-10-CM

## 2022-12-19 DIAGNOSIS — G47.33 OBSTRUCTIVE SLEEP APNEA SYNDROME: ICD-10-CM

## 2022-12-19 DIAGNOSIS — G40.309 GENERALIZED CONVULSIVE EPILEPSY (HCC): ICD-10-CM

## 2022-12-19 PROBLEM — G47.30 SLEEP DISORDER BREATHING: Status: ACTIVE | Noted: 2022-12-19

## 2023-03-29 ENCOUNTER — OFFICE VISIT (OUTPATIENT)
Dept: NEUROLOGY | Age: 68
End: 2023-03-29

## 2023-03-29 VITALS
BODY MASS INDEX: 57.46 KG/M2 | WEIGHT: 293 LBS | SYSTOLIC BLOOD PRESSURE: 126 MMHG | HEART RATE: 89 BPM | DIASTOLIC BLOOD PRESSURE: 84 MMHG

## 2023-03-29 DIAGNOSIS — G40.009 PARTIAL IDIOPATHIC EPILEPSY WITH SEIZURES OF LOCALIZED ONSET, NOT INTRACTABLE, WITHOUT STATUS EPILEPTICUS (HCC): ICD-10-CM

## 2023-03-29 DIAGNOSIS — G47.30 SLEEP DISORDER BREATHING: ICD-10-CM

## 2023-03-29 DIAGNOSIS — G40.009 PARTIAL IDIOPATHIC EPILEPSY WITH SEIZURES OF LOCALIZED ONSET, NOT INTRACTABLE, WITHOUT STATUS EPILEPTICUS (HCC): Primary | ICD-10-CM

## 2023-03-29 DIAGNOSIS — G40.309 GENERALIZED CONVULSIVE EPILEPSY (HCC): ICD-10-CM

## 2023-03-29 DIAGNOSIS — G93.89 ENCEPHALOMALACIA: ICD-10-CM

## 2023-03-29 DIAGNOSIS — I63.512 CEREBROVASCULAR ACCIDENT (CVA) DUE TO STENOSIS OF LEFT MIDDLE CEREBRAL ARTERY (HCC): ICD-10-CM

## 2023-03-29 DIAGNOSIS — G47.10 HYPERSOMNOLENCE: ICD-10-CM

## 2023-03-29 LAB
ALBUMIN SERPL-MCNC: 4.4 G/DL (ref 3.5–4.6)
ALP SERPL-CCNC: 119 U/L (ref 40–130)
ALT SERPL-CCNC: 14 U/L (ref 0–33)
AST SERPL-CCNC: 18 U/L (ref 0–35)
BILIRUB DIRECT SERPL-MCNC: <0.2 MG/DL (ref 0–0.4)
BILIRUB INDIRECT SERPL-MCNC: NORMAL MG/DL (ref 0–0.6)
BILIRUB SERPL-MCNC: 0.4 MG/DL (ref 0.2–0.7)
PHENOBARB SERPL-MCNC: 14.7 UG/ML (ref 10–30)
PHENYTOIN SERPL-MCNC: 8.9 UG/ML (ref 10–20)
PROT SERPL-MCNC: 8 G/DL (ref 6.3–8)

## 2023-03-29 RX ORDER — PHENOBARBITAL 64.8 MG/1
TABLET ORAL
COMMUNITY
Start: 2023-03-17

## 2023-03-29 RX ORDER — TEMAZEPAM 15 MG/1
15 CAPSULE ORAL NIGHTLY PRN
Qty: 30 CAPSULE | Refills: 0 | Status: SHIPPED | OUTPATIENT
Start: 2023-03-29 | End: 2023-04-28

## 2023-03-29 ASSESSMENT — ENCOUNTER SYMPTOMS
PHOTOPHOBIA: 0
CHOKING: 0
COLOR CHANGE: 0
NAUSEA: 0
VOMITING: 0
BACK PAIN: 0
SHORTNESS OF BREATH: 0
TROUBLE SWALLOWING: 0

## 2023-03-29 NOTE — PROGRESS NOTES
Reflexes are normal and symmetric. Babinski sign absent on the right side. Babinski sign absent on the left side. Psychiatric:         Mood and Affect: Mood normal.       No results found. Lab Results   Component Value Date/Time    WBC 6.9 10/22/2021 06:42 AM    RBC 3.35 10/22/2021 06:42 AM    HGB 11.0 10/22/2021 06:42 AM    HCT 31.0 10/22/2021 06:42 AM    MCV 92.5 10/22/2021 06:42 AM    MCH 32.9 10/22/2021 06:42 AM    MCHC 35.5 10/22/2021 06:42 AM    RDW 13.8 10/22/2021 06:42 AM     10/22/2021 06:42 AM    MPV 8.4 10/28/2014 04:15 PM     Lab Results   Component Value Date/Time     11/10/2022 03:51 PM    K 4.2 11/10/2022 03:51 PM     11/10/2022 03:51 PM    CO2 26 11/10/2022 03:51 PM    BUN 23 11/10/2022 03:51 PM    CREATININE 1.20 11/10/2022 03:51 PM    GFRAA 58.4 09/28/2022 03:39 PM    LABGLOM 50.0 11/10/2022 03:51 PM    GLUCOSE 84 11/10/2022 03:51 PM    PROT 8.0 03/29/2023 02:24 PM    LABALBU 4.4 03/29/2023 02:24 PM    CALCIUM 8.9 11/10/2022 03:51 PM    BILITOT 0.4 03/29/2023 02:24 PM    ALKPHOS 119 03/29/2023 02:24 PM    AST 18 03/29/2023 02:24 PM    ALT 14 03/29/2023 02:24 PM     Lab Results   Component Value Date/Time    PROTIME 10.9 03/01/2016 04:25 PM    INR 1.0 03/01/2016 04:25 PM     Lab Results   Component Value Date/Time    TSH 1.820 10/20/2021 08:04 AM     Lab Results   Component Value Date/Time    TRIG 152 03/01/2016 04:25 PM    HDL 45 03/01/2016 04:25 PM    LDLCALC 150 03/01/2016 04:25 PM     No results found for: Roselinda Tyler, LABBENZ, CANNAB, COCAINESCRN, LABMETH, OPIATESCREENURINE, PHENCYCLIDINESCREENURINE, PPXUR, ETOH  No results found for: LITHIUM, DILFRTOT, VALPROATE    Assessment:       Diagnosis Orders   1. Partial idiopathic epilepsy with seizures of localized onset, not intractable, without status epilepticus (HCC)  Phenobarbital Level    Phenytoin Level, Total    Hepatic Function Panel      2. Sleep disorder breathing  temazepam (RESTORIL) 15 MG capsule      3.

## 2023-05-08 LAB
ALANINE AMINOTRANSFERASE (SGPT) (U/L) IN SER/PLAS: 12 U/L (ref 7–45)
ALBUMIN (G/DL) IN SER/PLAS: 4.3 G/DL (ref 3.4–5)
ALBUMIN: 4.3 G/DL (ref 3.4–5)
ALKALINE PHOSPHATASE (U/L) IN SER/PLAS: 103 U/L (ref 33–136)
ALP BLD-CCNC: 103 U/L (ref 33–136)
ALT SERPL-CCNC: 12 U/L (ref 7–45)
ANION GAP IN SER/PLAS: 14 MMOL/L (ref 10–20)
ANION GAP SERPL CALCULATED.3IONS-SCNC: 14 MMOL/L (ref 10–20)
ASPARTATE AMINOTRANSFERASE (SGOT) (U/L) IN SER/PLAS: 17 U/L (ref 9–39)
AST SERPL-CCNC: 17 U/L (ref 9–39)
B-TYPE NATRIURETIC PEPTIDE: 61 PG/ML (ref 0–99)
BICARBONATE: 24 MMOL/L (ref 21–32)
BILIRUB SERPL-MCNC: 0.4 MG/DL (ref 0–1.2)
BILIRUBIN TOTAL (MG/DL) IN SER/PLAS: 0.4 MG/DL (ref 0–1.2)
CALCIUM (MG/DL) IN SER/PLAS: 9.5 MG/DL (ref 8.6–10.3)
CALCIUM SERPL-MCNC: 9.5 MG/DL (ref 8.6–10.3)
CARBON DIOXIDE, TOTAL (MMOL/L) IN SER/PLAS: 24 MMOL/L (ref 21–32)
CHLORIDE (MMOL/L) IN SER/PLAS: 103 MMOL/L (ref 98–107)
CHLORIDE BLD-SCNC: 103 MMOL/L (ref 98–107)
CHOLESTEROL (MG/DL) IN SER/PLAS: 234 MG/DL (ref 0–199)
CHOLESTEROL IN HDL (MG/DL) IN SER/PLAS: 48.2 MG/DL
CHOLESTEROL/HDL RATIO: 4.9
CHOLESTEROL/HDL RATIO: 4.9
CHOLESTEROL: 234 MG/DL (ref 0–199)
CREAT SERPL-MCNC: 1.24 MG/DL (ref 0.5–1.05)
CREATININE (MG/DL) IN SER/PLAS: 1.24 MG/DL (ref 0.5–1.05)
EGFR FEMALE: 48 ML/MIN/1.73M2
GFR FEMALE: 48 ML/MIN/1.73M2
GLUCOSE (MG/DL) IN SER/PLAS: 96 MG/DL (ref 74–99)
GLUCOSE: 96 MG/DL (ref 74–99)
HDLC SERPL-MCNC: 48.2 MG/DL
LDL CHOLESTEROL: 153 MG/DL (ref 0–99)
LDL: 153 MG/DL (ref 0–99)
NATRIURETIC PEPTIDE B (PG/ML) IN SER/PLAS: 61 PG/ML (ref 0–99)
POTASSIUM (MMOL/L) IN SER/PLAS: 4.1 MMOL/L (ref 3.5–5.3)
POTASSIUM SERPL-SCNC: 4.1 MMOL/L (ref 3.5–5.3)
PROTEIN TOTAL: 8.1 G/DL (ref 6.4–8.2)
SODIUM (MMOL/L) IN SER/PLAS: 137 MMOL/L (ref 136–145)
SODIUM BLD-SCNC: 137 MMOL/L (ref 136–145)
TOTAL PROTEIN: 8.1 G/DL (ref 6.4–8.2)
TRIGL SERPL-MCNC: 165 MG/DL (ref 0–149)
TRIGLYCERIDE (MG/DL) IN SER/PLAS: 165 MG/DL (ref 0–149)
UREA NITROGEN (MG/DL) IN SER/PLAS: 21 MG/DL (ref 6–23)
UREA NITROGEN: 21 MG/DL (ref 6–23)
VLDL: 33 MG/DL (ref 0–40)
VLDLC SERPL CALC-MCNC: 33 MG/DL (ref 0–40)

## 2023-06-12 DIAGNOSIS — G40.309 GENERALIZED CONVULSIVE EPILEPSY (HCC): Primary | ICD-10-CM

## 2023-06-12 RX ORDER — PHENOBARBITAL 64.8 MG/1
64.8 TABLET ORAL 2 TIMES DAILY
Qty: 60 TABLET | Refills: 0 | Status: SHIPPED | OUTPATIENT
Start: 2023-06-12 | End: 2023-07-28 | Stop reason: SDUPTHER

## 2023-06-12 NOTE — TELEPHONE ENCOUNTER
Pt is requesting medication refill. Please approve or deny this request.    Rx requested:  Requested Prescriptions     Pending Prescriptions Disp Refills    PHENobarbital (LUMINAL) 64.8 MG tablet 60 tablet 0     Sig: Take 1 tablet by mouth 2 times daily for 30 days.  Max Daily Amount: 129.6 mg         Last Office Visit:   3/29/2023      Next Visit Date:  Future Appointments   Date Time Provider [address]   9/28/2023  1:45 PM MD Sonia Avendaño [facility] -

## 2023-07-28 DIAGNOSIS — G40.309 GENERALIZED CONVULSIVE EPILEPSY (HCC): ICD-10-CM

## 2023-07-28 RX ORDER — PHENOBARBITAL 64.8 MG/1
64.8 TABLET ORAL 2 TIMES DAILY
Qty: 60 TABLET | Refills: 0 | Status: SHIPPED | OUTPATIENT
Start: 2023-07-28 | End: 2023-08-27

## 2023-07-28 NOTE — TELEPHONE ENCOUNTER
requesting medication refill. Please approve or deny this request.    Rx requested:  Requested Prescriptions     Pending Prescriptions Disp Refills    PHENobarbital (LUMINAL) 64.8 MG tablet 180 tablet 0     Sig: Take 1 tablet by mouth 2 times daily for 90 days.  Max Daily Amount: 129.6 mg         Last Office Visit:   03/29/2023      Next Visit Date:  Future Appointments   Date Time Provider [address]   9/28/2023  1:45 PM MD Lia [facility] -

## 2023-08-02 ENCOUNTER — TELEPHONE (OUTPATIENT)
Dept: NEUROLOGY | Age: 68
End: 2023-08-02

## 2023-08-02 NOTE — TELEPHONE ENCOUNTER
Patient saw her cardiologist yesterday and she is putting her on Lipitor and she wants to make sure its okay because she read that if you had a stroke in the past you should talk to your doctor about starting      Please advise      [phone]

## 2023-09-11 DIAGNOSIS — G40.309 GENERALIZED CONVULSIVE EPILEPSY (HCC): ICD-10-CM

## 2023-09-11 RX ORDER — PHENOBARBITAL 64.8 MG/1
64.8 TABLET ORAL 2 TIMES DAILY
Qty: 60 TABLET | Refills: 2 | Status: SHIPPED | OUTPATIENT
Start: 2023-09-11 | End: 2023-09-28 | Stop reason: SDUPTHER

## 2023-09-11 NOTE — TELEPHONE ENCOUNTER
Pt is requesting medication refill. Please approve or deny this request.    Rx requested:  Requested Prescriptions     Pending Prescriptions Disp Refills    PHENobarbital (LUMINAL) 64.8 MG tablet 60 tablet 0     Sig: Take 1 tablet by mouth 2 times daily for 30 days.  Max Daily Amount: 129.6 mg         Last Office Visit:   3/29/2023      Next Visit Date:  Future Appointments   Date Time Provider [address] [place] [address]   9/28/2023  1:45 PM MD Michele Houston [facility] -

## 2023-09-28 ENCOUNTER — OFFICE VISIT (OUTPATIENT)
Dept: NEUROLOGY | Age: 68
End: 2023-09-28
Payer: MEDICARE

## 2023-09-28 VITALS
BODY MASS INDEX: 56.96 KG/M2 | HEART RATE: 102 BPM | SYSTOLIC BLOOD PRESSURE: 138 MMHG | WEIGHT: 293 LBS | DIASTOLIC BLOOD PRESSURE: 82 MMHG

## 2023-09-28 DIAGNOSIS — G93.89 ENCEPHALOMALACIA: ICD-10-CM

## 2023-09-28 DIAGNOSIS — G40.409 OTHER GENERALIZED EPILEPSY, NOT INTRACTABLE, WITHOUT STATUS EPILEPTICUS (HCC): ICD-10-CM

## 2023-09-28 DIAGNOSIS — G47.10 HYPERSOMNOLENCE: ICD-10-CM

## 2023-09-28 DIAGNOSIS — I63.9 OCCIPITAL INFARCTION (HCC): ICD-10-CM

## 2023-09-28 DIAGNOSIS — G40.309 GENERALIZED CONVULSIVE EPILEPSY (HCC): Primary | ICD-10-CM

## 2023-09-28 DIAGNOSIS — I63.512 CEREBROVASCULAR ACCIDENT (CVA) DUE TO STENOSIS OF LEFT MIDDLE CEREBRAL ARTERY (HCC): ICD-10-CM

## 2023-09-28 PROCEDURE — 1123F ACP DISCUSS/DSCN MKR DOCD: CPT | Performed by: PSYCHIATRY & NEUROLOGY

## 2023-09-28 PROCEDURE — 1090F PRES/ABSN URINE INCON ASSESS: CPT | Performed by: PSYCHIATRY & NEUROLOGY

## 2023-09-28 PROCEDURE — G8417 CALC BMI ABV UP PARAM F/U: HCPCS | Performed by: PSYCHIATRY & NEUROLOGY

## 2023-09-28 PROCEDURE — 1036F TOBACCO NON-USER: CPT | Performed by: PSYCHIATRY & NEUROLOGY

## 2023-09-28 PROCEDURE — 3078F DIAST BP <80 MM HG: CPT | Performed by: PSYCHIATRY & NEUROLOGY

## 2023-09-28 PROCEDURE — 99214 OFFICE O/P EST MOD 30 MIN: CPT | Performed by: PSYCHIATRY & NEUROLOGY

## 2023-09-28 PROCEDURE — 3074F SYST BP LT 130 MM HG: CPT | Performed by: PSYCHIATRY & NEUROLOGY

## 2023-09-28 PROCEDURE — 3017F COLORECTAL CA SCREEN DOC REV: CPT | Performed by: PSYCHIATRY & NEUROLOGY

## 2023-09-28 PROCEDURE — G8400 PT W/DXA NO RESULTS DOC: HCPCS | Performed by: PSYCHIATRY & NEUROLOGY

## 2023-09-28 PROCEDURE — G8427 DOCREV CUR MEDS BY ELIG CLIN: HCPCS | Performed by: PSYCHIATRY & NEUROLOGY

## 2023-09-28 RX ORDER — PHENOBARBITAL 64.8 MG/1
64.8 TABLET ORAL 2 TIMES DAILY
Qty: 180 TABLET | Refills: 1 | Status: SHIPPED | OUTPATIENT
Start: 2023-09-28 | End: 2024-03-26

## 2023-09-28 RX ORDER — ATORVASTATIN CALCIUM 40 MG/1
TABLET, FILM COATED ORAL
COMMUNITY
Start: 2023-08-01

## 2023-09-28 ASSESSMENT — ENCOUNTER SYMPTOMS
TROUBLE SWALLOWING: 0
PHOTOPHOBIA: 0
NAUSEA: 0
VOMITING: 0
COLOR CHANGE: 0
SHORTNESS OF BREATH: 0
BACK PAIN: 0
CHOKING: 0

## 2023-09-28 NOTE — PROGRESS NOTES
any strokes or seizures seen we were discussing her hypersomnolence which we see on her sleep study. This could be related to medications and she takes some few daytime naps. She was having insomnia though now she reports she is not and we had given her temazepam which she has not tried. In the event that this requires to be changed we may need to give her a sleeper and then a medication to keep her awake. Patient is on Xarelto and also on phenobarbital which may interact and the effects of Xarelto may be somewhat lower. She does not have atrial fibrillation this for DVT and has not had recurrent DVT and therefore this is likely clinically effective. Patient's epilepsy is from an encephalomalacia and her labs are therapeutic for now. Adam Ruelas MD, Anupama Rebolledo, American Board of Psychiatry & Neurology  Board Certified in Vascular Neurology  Board Certified in Neuromuscular Medicine  Certified in [address] [place] [address]:      No orders of the defined types were placed in this encounter. No orders of the defined types were placed in this encounter. No follow-ups on file.       Lauryn Shepard MD

## 2023-12-08 ENCOUNTER — APPOINTMENT (OUTPATIENT)
Dept: ORTHOPEDIC SURGERY | Facility: CLINIC | Age: 68
End: 2023-12-08
Payer: MEDICARE

## 2023-12-18 ENCOUNTER — APPOINTMENT (OUTPATIENT)
Dept: ORTHOPEDIC SURGERY | Facility: CLINIC | Age: 68
End: 2023-12-18
Payer: MEDICARE

## 2023-12-27 PROBLEM — R06.02 SHORT OF BREATH ON EXERTION: Status: ACTIVE | Noted: 2023-12-27

## 2023-12-27 PROBLEM — Z86.711 HISTORY OF PULMONARY EMBOLISM: Status: ACTIVE | Noted: 2023-12-27

## 2023-12-27 PROBLEM — I50.32: Status: ACTIVE | Noted: 2023-12-27

## 2023-12-27 PROBLEM — I10 HYPERTENSION: Status: ACTIVE | Noted: 2023-12-27

## 2023-12-27 PROBLEM — N18.30 STAGE 3 CHRONIC KIDNEY DISEASE (MULTI): Status: ACTIVE | Noted: 2023-12-27

## 2023-12-27 PROBLEM — R01.1 MURMUR: Status: ACTIVE | Noted: 2023-12-27

## 2023-12-27 PROBLEM — I44.7 INCOMPLETE LEFT BUNDLE BRANCH BLOCK (LBBB): Status: ACTIVE | Noted: 2023-12-27

## 2023-12-27 PROBLEM — N18.31 STAGE 3A CHRONIC KIDNEY DISEASE (MULTI): Status: ACTIVE | Noted: 2023-12-27

## 2023-12-27 PROBLEM — G47.30 SLEEP APNEA: Status: ACTIVE | Noted: 2023-12-27

## 2023-12-27 RX ORDER — CHOLECALCIFEROL (VITAMIN D3) 25 MCG
1 TABLET ORAL DAILY
COMMUNITY

## 2023-12-27 RX ORDER — PHENYTOIN SODIUM EXTENDED 100 MG
CAPSULE ORAL
COMMUNITY

## 2023-12-27 RX ORDER — METOPROLOL SUCCINATE 25 MG/1
1 TABLET, EXTENDED RELEASE ORAL DAILY
COMMUNITY
Start: 2021-10-22

## 2023-12-27 RX ORDER — IRBESARTAN 150 MG/1
1 TABLET ORAL DAILY
COMMUNITY
Start: 2021-08-06

## 2023-12-27 RX ORDER — COLCHICINE 0.6 MG/1
1 TABLET ORAL DAILY PRN
COMMUNITY
Start: 2021-03-08

## 2023-12-27 RX ORDER — INDAPAMIDE 1.25 MG/1
1 TABLET ORAL DAILY
COMMUNITY

## 2023-12-27 RX ORDER — RIVAROXABAN 20 MG/1
1 TABLET, FILM COATED ORAL DAILY
COMMUNITY
Start: 2020-11-07

## 2023-12-27 RX ORDER — PHENOBARBITAL 64.8 MG/1
TABLET ORAL
COMMUNITY
Start: 2021-10-29

## 2023-12-27 RX ORDER — NYSTATIN 100000 [USP'U]/G
POWDER TOPICAL
COMMUNITY
Start: 2021-05-18

## 2024-03-08 PROBLEM — M10.9 ACUTE GOUT OF RIGHT FOOT: Status: ACTIVE | Noted: 2023-06-20

## 2024-03-08 PROBLEM — M20.41 ACQUIRED HAMMER TOE OF RIGHT FOOT: Status: ACTIVE | Noted: 2023-06-20

## 2024-03-08 PROBLEM — M54.41 RIGHT-SIDED LOW BACK PAIN WITH RIGHT-SIDED SCIATICA: Status: ACTIVE | Noted: 2023-06-20

## 2024-03-11 PROBLEM — G40.409 OTHER GENERALIZED EPILEPSY AND EPILEPTIC SYNDROMES, NOT INTRACTABLE, WITHOUT STATUS EPILEPTICUS (HCC): Status: ACTIVE | Noted: 2024-03-11

## 2024-03-27 ENCOUNTER — OFFICE VISIT (OUTPATIENT)
Dept: NEUROLOGY | Age: 69
End: 2024-03-27
Payer: MEDICARE

## 2024-03-27 VITALS
BODY MASS INDEX: 59.57 KG/M2 | HEART RATE: 68 BPM | WEIGHT: 293 LBS | DIASTOLIC BLOOD PRESSURE: 64 MMHG | SYSTOLIC BLOOD PRESSURE: 120 MMHG

## 2024-03-27 DIAGNOSIS — G40.309 GENERALIZED CONVULSIVE EPILEPSY (HCC): ICD-10-CM

## 2024-03-27 DIAGNOSIS — G93.89 ENCEPHALOMALACIA: ICD-10-CM

## 2024-03-27 DIAGNOSIS — G47.10 HYPERSOMNOLENCE: ICD-10-CM

## 2024-03-27 DIAGNOSIS — G40.309 NONINTRACTABLE GENERALIZED IDIOPATHIC EPILEPSY WITHOUT STATUS EPILEPTICUS (HCC): Primary | ICD-10-CM

## 2024-03-27 DIAGNOSIS — R27.0 ATAXIA: ICD-10-CM

## 2024-03-27 DIAGNOSIS — G40.409 OTHER GENERALIZED EPILEPSY AND EPILEPTIC SYNDROMES, NOT INTRACTABLE, WITHOUT STATUS EPILEPTICUS (HCC): ICD-10-CM

## 2024-03-27 LAB
ALBUMIN SERPL-MCNC: 3.9 G/DL (ref 3.5–4.6)
ALP SERPL-CCNC: 127 U/L (ref 40–130)
ALT SERPL-CCNC: 16 U/L (ref 0–33)
AST SERPL-CCNC: 15 U/L (ref 0–35)
BILIRUB DIRECT SERPL-MCNC: <0.2 MG/DL (ref 0–0.4)
BILIRUB INDIRECT SERPL-MCNC: NORMAL MG/DL (ref 0–0.6)
BILIRUB SERPL-MCNC: <0.2 MG/DL (ref 0.2–0.7)
PHENOBARB SERPL-MCNC: 14.4 UG/ML (ref 10–30)
PHENYTOIN SERPL-MCNC: 9 UG/ML (ref 10–20)
PROT SERPL-MCNC: 7.5 G/DL (ref 6.3–8)

## 2024-03-27 PROCEDURE — G8417 CALC BMI ABV UP PARAM F/U: HCPCS | Performed by: PSYCHIATRY & NEUROLOGY

## 2024-03-27 PROCEDURE — G8427 DOCREV CUR MEDS BY ELIG CLIN: HCPCS | Performed by: PSYCHIATRY & NEUROLOGY

## 2024-03-27 PROCEDURE — G8484 FLU IMMUNIZE NO ADMIN: HCPCS | Performed by: PSYCHIATRY & NEUROLOGY

## 2024-03-27 PROCEDURE — 99214 OFFICE O/P EST MOD 30 MIN: CPT | Performed by: PSYCHIATRY & NEUROLOGY

## 2024-03-27 PROCEDURE — G8400 PT W/DXA NO RESULTS DOC: HCPCS | Performed by: PSYCHIATRY & NEUROLOGY

## 2024-03-27 PROCEDURE — 3078F DIAST BP <80 MM HG: CPT | Performed by: PSYCHIATRY & NEUROLOGY

## 2024-03-27 PROCEDURE — 1123F ACP DISCUSS/DSCN MKR DOCD: CPT | Performed by: PSYCHIATRY & NEUROLOGY

## 2024-03-27 PROCEDURE — 1036F TOBACCO NON-USER: CPT | Performed by: PSYCHIATRY & NEUROLOGY

## 2024-03-27 PROCEDURE — 3017F COLORECTAL CA SCREEN DOC REV: CPT | Performed by: PSYCHIATRY & NEUROLOGY

## 2024-03-27 PROCEDURE — 1090F PRES/ABSN URINE INCON ASSESS: CPT | Performed by: PSYCHIATRY & NEUROLOGY

## 2024-03-27 PROCEDURE — 3074F SYST BP LT 130 MM HG: CPT | Performed by: PSYCHIATRY & NEUROLOGY

## 2024-03-27 RX ORDER — PHENOBARBITAL 64.8 MG/1
64.8 TABLET ORAL 2 TIMES DAILY
Qty: 180 TABLET | Refills: 1 | Status: SHIPPED | OUTPATIENT
Start: 2024-03-27 | End: 2024-09-23

## 2024-03-27 NOTE — PROGRESS NOTES
Time of Last Dose?     Answer:   morning dose    Hepatic Function Panel     Standing Status:   Future     Standing Expiration Date:   3/27/2025    Phenobarbital Level     Standing Status:   Future     Standing Expiration Date:   3/27/2025     Order Specific Question:   Dose Schedule & Time of Last Dose?     Answer:   am     Orders Placed This Encounter   Medications    PHENobarbital 64.8 MG tablet     Sig: Take 1 tablet by mouth in the morning and at bedtime for 180 days. Max Daily Amount: 129.6 mg     Dispense:  180 tablet     Refill:  1       No follow-ups on file.      Adam Alonzo MD

## 2024-06-26 DIAGNOSIS — G40.309 GENERALIZED CONVULSIVE EPILEPSY (HCC): ICD-10-CM

## 2024-06-26 NOTE — TELEPHONE ENCOUNTER
Patient is requesting medication refill. Please approve or deny this request.    Rx requested:  Requested Prescriptions     Pending Prescriptions Disp Refills    PHENobarbital 64.8 MG tablet 180 tablet 1     Sig: Take 1 tablet by mouth in the morning and at bedtime for 180 days. Max Daily Amount: 129.6 mg         Last Office Visit:   3/27/2024      Next Visit Date:  Future Appointments   Date Time Provider Department Center   9/25/2024  2:00 PM Adam Alonzo MD [place] [facility] -   ;

## 2024-06-27 RX ORDER — PHENOBARBITAL 64.8 MG/1
64.8 TABLET ORAL 2 TIMES DAILY
Qty: 180 TABLET | Refills: 1 | Status: SHIPPED | OUTPATIENT
Start: 2024-06-27 | End: 2024-12-24

## 2024-07-30 ENCOUNTER — APPOINTMENT (OUTPATIENT)
Dept: CARDIOLOGY | Facility: CLINIC | Age: 69
End: 2024-07-30
Payer: MEDICARE

## 2024-07-30 VITALS
WEIGHT: 293 LBS | SYSTOLIC BLOOD PRESSURE: 124 MMHG | DIASTOLIC BLOOD PRESSURE: 82 MMHG | HEART RATE: 76 BPM | BODY MASS INDEX: 47.09 KG/M2 | HEIGHT: 66 IN

## 2024-07-30 DIAGNOSIS — G47.33 OBSTRUCTIVE SLEEP APNEA: ICD-10-CM

## 2024-07-30 DIAGNOSIS — R06.02 SHORT OF BREATH ON EXERTION: ICD-10-CM

## 2024-07-30 DIAGNOSIS — I48.0 PAROXYSMAL ATRIAL FIBRILLATION (MULTI): ICD-10-CM

## 2024-07-30 DIAGNOSIS — N18.31 STAGE 3A CHRONIC KIDNEY DISEASE (MULTI): ICD-10-CM

## 2024-07-30 DIAGNOSIS — I27.20 PULMONARY HYPERTENSION (MULTI): ICD-10-CM

## 2024-07-30 DIAGNOSIS — I10 PRIMARY HYPERTENSION: ICD-10-CM

## 2024-07-30 DIAGNOSIS — E78.2 MIXED HYPERLIPIDEMIA: ICD-10-CM

## 2024-07-30 PROCEDURE — 1157F ADVNC CARE PLAN IN RCRD: CPT | Performed by: INTERNAL MEDICINE

## 2024-07-30 PROCEDURE — 1159F MED LIST DOCD IN RCRD: CPT | Performed by: INTERNAL MEDICINE

## 2024-07-30 PROCEDURE — 1160F RVW MEDS BY RX/DR IN RCRD: CPT | Performed by: INTERNAL MEDICINE

## 2024-07-30 PROCEDURE — 3008F BODY MASS INDEX DOCD: CPT | Performed by: INTERNAL MEDICINE

## 2024-07-30 PROCEDURE — G2211 COMPLEX E/M VISIT ADD ON: HCPCS | Performed by: INTERNAL MEDICINE

## 2024-07-30 PROCEDURE — 3074F SYST BP LT 130 MM HG: CPT | Performed by: INTERNAL MEDICINE

## 2024-07-30 PROCEDURE — 3079F DIAST BP 80-89 MM HG: CPT | Performed by: INTERNAL MEDICINE

## 2024-07-30 PROCEDURE — 99214 OFFICE O/P EST MOD 30 MIN: CPT | Performed by: INTERNAL MEDICINE

## 2024-07-30 RX ORDER — METOPROLOL SUCCINATE 25 MG/1
25 TABLET, EXTENDED RELEASE ORAL DAILY
Qty: 90 TABLET | Refills: 3 | Status: SHIPPED | OUTPATIENT
Start: 2024-07-30 | End: 2025-07-30

## 2024-07-30 RX ORDER — ATORVASTATIN CALCIUM 40 MG/1
40 TABLET, FILM COATED ORAL DAILY
Qty: 90 TABLET | Refills: 3 | Status: SHIPPED | OUTPATIENT
Start: 2024-07-30 | End: 2025-07-30

## 2024-07-30 RX ORDER — RIVAROXABAN 20 MG/1
20 TABLET, FILM COATED ORAL DAILY
Qty: 90 TABLET | Refills: 3 | Status: SHIPPED | OUTPATIENT
Start: 2024-07-30 | End: 2025-07-30

## 2024-07-30 ASSESSMENT — ENCOUNTER SYMPTOMS
DEPRESSION: 0
LOSS OF SENSATION IN FEET: 0
OCCASIONAL FEELINGS OF UNSTEADINESS: 0

## 2024-07-30 NOTE — PROGRESS NOTES
1 year FU.  Subjective :     Multiple comorbidities as listed below.  Most of her issues circled around her weight.  Blood pressure is at target  Denies palpitations.  Has chronic shortness of breath which is multifactorial.  Ambulates with the help of a walker, and is careful to avoid falls.  Not always consistent with CPAP therapy.      History so Far :    1. Hypertension  2. Paroxysmal atrial fibrillation-no recent clinical recurrence,  remains on high-risk medication Xarelto without bleeding diathesis.  3. Obstructive sleep apnea-needs continued followup with Dr. Stout.  4. Negative perfusion study August 2016.  5. Echo 2016 left ventricular ejection fraction 55% to 60%,  aortic sclerosis with valve area of 1.9 cm2. This study was done in the setting of pulmonary embolism at which time RV systolic pressure was 99 mmHg, peak and mean gradients across the aortic valve then were 39 and 18 respectively.  6. Severe pulmonary hypertension with pulmonary artery pressure of  98 in March 2016 in the setting of pulmonary embolism, pulmonary  artery pressure 30 mmHg August 2016,  7. Echo left atrial size 4 cm March 2016.  8.Carotid ultrasound December 2017-stent 50% bilateral carotid stenosis and bilateral antegrade vertebral flow.  9. 48-hour Holter June 2019-no atrial fibrillation, rare ventricular premature beats, average heart rate somewhat blunted at 57 bpm, longest R to R interval 1.6 seconds, upto 5 beat runs of paroxysmal atrial tachycardia  10. Gout.  11. Obstructive sleep apnea with inconsistent use of CPAP therapy  12. Increased BMI-she is losing weight  13. Hospital admission with profound hyponatremia down to 118, chlorthalidone was discontinued, and sodium levels improved. Dilantin levels were also elevated during that hospital stay. Managed by neurology.  14. Carotid ultrasound November 2021-less than 50% bilateral internal carotid artery stenosis bilateral antegrade vertebral flow  15. Chronic kidney  disease stage IIIa, GFR 50 November 2022  16. Morbid obesity-we had a discussion about this, we also talked about considering bariatric surgical options, but she says such therapy will not work if it is not permanent, because she does not believe that she will be able to follow dietary restriction.  17. Echocardiogram October 2022-LVEF 65 to 70%, center for dagger shaped LV outflow tract impaired relaxation ultrasound used for Endo cardial visualization normal RV size and systolic function probable RV thickening difficult to assess valves poor echogenic windows peak gradient 47 mmHg across the aortic valve mean gradient 28 mmHg difficult to distinguish between intrinsic aortic valve disease versus outflow tract obstruction aortic root measures normal left atrial diameter 4.5 cm left ventricular septum 1.2 cm posterior wall 1.2 cm LV end-systolic dimension 2.5 cm ascending aorta 3.7 cm aortic valve area with LVOT diameter of 2.2 cm was measured at 1.6 question meters dimensionless index of the aortic valve 0.44. Tricuspid regurgitation was not assessed, or able to be assessed, PA pressure not reported.     18. EKG May 2019-normal sinus rhythm normal MI interval incomplete left bundle branch block with ST-T abnormality.  19. Echocardiogram May 2023-LVEF 65%, borderline to mild left ventricular hypertrophy, abnormal diastolic filling, hint of mid cavitary gradient, but relatively mild, peak to peak gradient 37 mmHg across the aortic valve mean gradient 20 mmHg aortic valve area roughly 1.6 cmÂ² trace to mild aortic regurgitation aortic root size normal findings very similar to prior echo of October 2020 left atrial diameter 4.2 cm, LV end-systolic diameter 2.6 cm     Objective   Wt Readings from Last 3 Encounters:   07/30/24 (!) 158 kg (348 lb)   08/01/23 (!) 153 kg (337 lb)   11/10/22 (!) 158 kg (348 lb)            Vitals:    07/30/24 1530   BP: 124/82   BP Location: Right arm   Patient Position: Sitting   Pulse: 76  "  Weight: (!) 158 kg (348 lb)   Height: 1.676 m (5' 6\")                Physical Exam:    GENERAL APPEARANCE: in no acute distress.  CHEST: Symmetric and non-tender.  INTEGUMENT: Skin warm and dry  HEENT: No gross abnormalities identified.No pallor or scleral icterus.  NECK: Supple, no JVD, no bruit.   NEURO/PSHCY: Alert and oriented x3; appropriate behavior and responses and responses  LUNGS: Clear to auscultation bilaterally; normal respiratory effort.  HEART: Rate and rhythm regular with grade 2/6 crescendo decrescendo murmur along the left sternal border; no gallop appreciated.   ABDOMEN: Soft, non tender.  MUSCULOSKELETAL: Ambulates with walker  EXTREMITIES: Warm  There is no edema noted.    Meds:  Current Outpatient Medications   Medication Instructions    atorvastatin (LIPITOR) 40 mg, oral, Daily    cholecalciferol (Vitamin D-3) 25 MCG (1000 UT) tablet 1 tablet, oral, Daily    colchicine 0.6 mg tablet 1 tablet, Daily PRN    Dilantin KapseaL 100 mg capsule take four tabs daily    indapamide (Lozol) 1.25 mg tablet 1 tablet, oral, Daily    irbesartan (Avapro) 150 mg tablet 1 tablet, oral, Daily    metoprolol succinate XL (TOPROL-XL) 25 mg, oral, Daily    Nyamyc 100,000 unit/gram powder Take as directed    PHENobarbitaL 64.8 mg tablet Take two tabs dailiy    Xarelto 20 mg, oral, Daily          Allergies   Allergen Reactions    Diltiazem Unknown             LABS:    Lab Results   Component Value Date    WBC 4.7 05/28/2019    HGB 12.6 05/28/2019    HCT 39.7 05/28/2019     05/28/2019    CHOL 234 (H) 05/08/2023    TRIG 165 (H) 05/08/2023    HDL 48.2 05/08/2023    ALT 12 05/08/2023    AST 17 05/08/2023     05/08/2023    K 4.1 05/08/2023     05/08/2023    CREATININE 1.24 (H) 05/08/2023    BUN 21 05/08/2023    CO2 24 05/08/2023    TSH 2.77 05/28/2019                 Lipid profile May 2023-total cholesterol 234 HDL 48,  triglycerides 165      Patient Active Problem List    Diagnosis Date Noted    " Paroxysmal atrial fibrillation (Multi) 07/30/2024    Pulmonary hypertension (Multi) 07/30/2024    BMI 50.0-59.9, adult (Multi) 07/30/2024    Heart failure, diastolic, chronic, etiology unknown (Multi) 12/27/2023    History of pulmonary embolism 12/27/2023    Hypertension 12/27/2023    Incomplete left bundle branch block (LBBB) 12/27/2023    Murmur 12/27/2023    Short of breath on exertion 12/27/2023    Obstructive sleep apnea 12/27/2023    Stage 3 chronic kidney disease (Multi) 12/27/2023    Stage 3a chronic kidney disease (Multi) 12/27/2023                 Assessment:    1. Primary hypertension  Comprehensive metabolic panel    CBC    Lipid panel    Follow Up In Cardiology    metoprolol succinate XL (Toprol-XL) 25 mg 24 hr tablet      2. Paroxysmal atrial fibrillation (Multi)  Comprehensive metabolic panel    CBC    Lipid panel    Follow Up In Cardiology    Xarelto 20 mg tablet      3. Obstructive sleep apnea  Comprehensive metabolic panel    CBC    Lipid panel    Follow Up In Cardiology      4. Pulmonary hypertension (Multi)  Comprehensive metabolic panel    CBC    Lipid panel    Follow Up In Cardiology      5. BMI 50.0-59.9, adult (Multi)  Comprehensive metabolic panel    CBC    Lipid panel    Follow Up In Cardiology      6. Stage 3a chronic kidney disease (Multi)  Comprehensive metabolic panel    CBC    Lipid panel    Follow Up In Cardiology      7. Short of breath on exertion  Comprehensive metabolic panel    CBC    Lipid panel    Follow Up In Cardiology      8. Mixed hyperlipidemia  Comprehensive metabolic panel    CBC    Lipid panel    Follow Up In Cardiology    atorvastatin (Lipitor) 40 mg tablet         Reviewed laboratory data from March 2024, liver enzymes are normal  total cholesterol 234.  Patient is now on atorvastatin 40 mg daily, and hence we have to repeat blood work.  My calculation of her creatinine clearance was 110.  Will continue current dose of Xarelto.  Follow up : 1  year  Comprehensive profile and lipid profile in the near future.  Patient's systolic murmur is due to a combination of hyperdynamic left ventricle diastolic dysfunction and mild calcific aortic stenosis.    Provider Attestation - Scribe documentation    All medical record entries made by the Scribe were at my direction and personally dictated by me. I have reviewed the chart and agree that the record accurately reflects my personal performance of the history, physical exam, discussion and plan.

## 2024-07-30 NOTE — PATIENT INSTRUCTIONS
PLEASE BRING ALL MEDICATION BOTTLES TO OFFICE VISITS.   STAY WELL HYDRATED.     HAVE LABS DONE BEFORE NEXT OFFICE VISIT (FASTING LABS)

## 2024-09-07 DIAGNOSIS — I10 PRIMARY HYPERTENSION: Primary | ICD-10-CM

## 2024-09-08 RX ORDER — IRBESARTAN 150 MG/1
150 TABLET ORAL DAILY
Qty: 90 TABLET | Refills: 3 | Status: SHIPPED | OUTPATIENT
Start: 2024-09-08

## 2024-09-23 PROBLEM — I44.7 INCOMPLETE LEFT BUNDLE BRANCH BLOCK (LBBB): Status: ACTIVE | Noted: 2023-12-27

## 2024-09-23 PROBLEM — Z86.711 HISTORY OF PULMONARY EMBOLISM: Status: ACTIVE | Noted: 2023-12-27

## 2024-09-23 PROBLEM — I50.32: Status: ACTIVE | Noted: 2023-12-27

## 2024-09-25 ENCOUNTER — OFFICE VISIT (OUTPATIENT)
Dept: NEUROLOGY | Age: 69
End: 2024-09-25
Payer: MEDICARE

## 2024-09-25 VITALS
BODY MASS INDEX: 58.08 KG/M2 | DIASTOLIC BLOOD PRESSURE: 62 MMHG | WEIGHT: 293 LBS | SYSTOLIC BLOOD PRESSURE: 122 MMHG | HEART RATE: 77 BPM

## 2024-09-25 DIAGNOSIS — G40.309 GENERALIZED CONVULSIVE EPILEPSY (HCC): ICD-10-CM

## 2024-09-25 DIAGNOSIS — G93.89 ENCEPHALOMALACIA: ICD-10-CM

## 2024-09-25 DIAGNOSIS — G45.9 TIA (TRANSIENT ISCHEMIC ATTACK): ICD-10-CM

## 2024-09-25 DIAGNOSIS — G40.309 GENERALIZED CONVULSIVE EPILEPSY (HCC): Primary | ICD-10-CM

## 2024-09-25 DIAGNOSIS — E55.9 VITAMIN D DEFICIENCY: ICD-10-CM

## 2024-09-25 DIAGNOSIS — G47.33 OBSTRUCTIVE SLEEP APNEA SYNDROME: ICD-10-CM

## 2024-09-25 DIAGNOSIS — R27.0 ATAXIA: ICD-10-CM

## 2024-09-25 DIAGNOSIS — E66.01 MORBID OBESITY: ICD-10-CM

## 2024-09-25 DIAGNOSIS — G47.10 HYPERSOMNOLENCE: ICD-10-CM

## 2024-09-25 LAB
PHENOBARB SERPL-MCNC: 16.4 UG/ML (ref 10–30)
PHENYTOIN SERPL-MCNC: 10.3 UG/ML (ref 10–20)

## 2024-09-25 PROCEDURE — 3074F SYST BP LT 130 MM HG: CPT | Performed by: PSYCHIATRY & NEUROLOGY

## 2024-09-25 PROCEDURE — 1123F ACP DISCUSS/DSCN MKR DOCD: CPT | Performed by: PSYCHIATRY & NEUROLOGY

## 2024-09-25 PROCEDURE — 1036F TOBACCO NON-USER: CPT | Performed by: PSYCHIATRY & NEUROLOGY

## 2024-09-25 PROCEDURE — G8427 DOCREV CUR MEDS BY ELIG CLIN: HCPCS | Performed by: PSYCHIATRY & NEUROLOGY

## 2024-09-25 PROCEDURE — 1090F PRES/ABSN URINE INCON ASSESS: CPT | Performed by: PSYCHIATRY & NEUROLOGY

## 2024-09-25 PROCEDURE — G8400 PT W/DXA NO RESULTS DOC: HCPCS | Performed by: PSYCHIATRY & NEUROLOGY

## 2024-09-25 PROCEDURE — 99214 OFFICE O/P EST MOD 30 MIN: CPT | Performed by: PSYCHIATRY & NEUROLOGY

## 2024-09-25 PROCEDURE — 3078F DIAST BP <80 MM HG: CPT | Performed by: PSYCHIATRY & NEUROLOGY

## 2024-09-25 PROCEDURE — G8417 CALC BMI ABV UP PARAM F/U: HCPCS | Performed by: PSYCHIATRY & NEUROLOGY

## 2024-09-25 PROCEDURE — 3017F COLORECTAL CA SCREEN DOC REV: CPT | Performed by: PSYCHIATRY & NEUROLOGY

## 2024-09-25 RX ORDER — PHENOBARBITAL 64.8 MG/1
64.8 TABLET ORAL 2 TIMES DAILY
Qty: 180 TABLET | Refills: 1 | Status: SHIPPED | OUTPATIENT
Start: 2024-09-25 | End: 2025-03-24

## 2024-09-25 NOTE — PROGRESS NOTES
Subjective:      Patient ID: Penny Suresh is a 69 y.o. female who presents today for:  Chief Complaint   Patient presents with    6 Month Follow-Up     Pt states things are ok. Things are about the same as last time no seizures. No question or concerns at this time        HPI 69 right-handed female with history of non-intractable generalized idiopathic epilepsy.  Patient has encephalomalacia as well.  Patient on phenobarbital 64.8 mg 2 times a day without any recurrent seizures.  Patient also is on phenytoin 4 mg at night.  Patient continues on Xarelto.  Patient still has considerable hypersomnolence.  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  Social History     Socioeconomic History    Marital status: Single     Spouse name: Not on file    Number of children: Not on file    Years of education: Not on file    Highest education level: Not on file   Occupational History    Not on file   Tobacco Use    Smoking status: Never    Smokeless tobacco: Never   Substance and Sexual Activity    Alcohol use: No    Drug use: No    Sexual activity: Not on file   Other Topics Concern    Not on file   Social History Narrative    Not on file     Social Determinants of Health     Financial Resource Strain: Not on file   Food Insecurity: Not on file   Transportation Needs: Not on file   Physical Activity: Not on file   Stress: Not on file   Social Connections: Not on file   Intimate Partner Violence: Not on file

## 2025-01-14 DIAGNOSIS — G40.309 GENERALIZED CONVULSIVE EPILEPSY (HCC): ICD-10-CM

## 2025-01-14 RX ORDER — PHENOBARBITAL 64.8 MG/1
64.8 TABLET ORAL 2 TIMES DAILY
Qty: 60 TABLET | Refills: 0 | Status: SHIPPED | OUTPATIENT
Start: 2025-01-14 | End: 2025-02-13

## 2025-01-14 NOTE — TELEPHONE ENCOUNTER
Requested Prescriptions     Pending Prescriptions Disp Refills    PHENobarbital 64.8 MG tablet 60 tablet 0     Sig: Take 1 tablet by mouth in the morning and at bedtime for 30 days. Max Daily Amount: 129.6 mg

## 2025-02-10 DIAGNOSIS — G40.309 GENERALIZED CONVULSIVE EPILEPSY (HCC): ICD-10-CM

## 2025-02-10 RX ORDER — PHENOBARBITAL 64.8 MG/1
64.8 TABLET ORAL 2 TIMES DAILY
Qty: 60 TABLET | Refills: 0 | Status: SHIPPED | OUTPATIENT
Start: 2025-02-10 | End: 2025-03-12

## 2025-02-10 NOTE — TELEPHONE ENCOUNTER
Requesting medication refill. Please approve or deny this request.    Rx requested:  Requested Prescriptions     Pending Prescriptions Disp Refills    PHENobarbital 64.8 MG tablet 60 tablet 0     Sig: Take 1 tablet by mouth in the morning and at bedtime for 30 days. Max Daily Amount: 129.6 mg         Last Office Visit:   9/25/2024      Next Visit Date:  Future Appointments   Date Time Provider Department Center   3/26/2025  2:00 PM Adam Alonzo MD [place] [facility] -               Last refill 1/14/25. Please approve or deny.

## 2025-03-12 DIAGNOSIS — G40.309 GENERALIZED CONVULSIVE EPILEPSY (HCC): ICD-10-CM

## 2025-03-12 RX ORDER — PHENOBARBITAL 64.8 MG/1
64.8 TABLET ORAL 2 TIMES DAILY
Qty: 60 TABLET | Refills: 3 | Status: SHIPPED | OUTPATIENT
Start: 2025-03-12 | End: 2025-07-10

## 2025-03-12 NOTE — TELEPHONE ENCOUNTER
Requesting medication refill. Please approve or deny this request.    Rx requested:  Requested Prescriptions     Pending Prescriptions Disp Refills    PHENobarbital 64.8 MG tablet 60 tablet 3     Sig: Take 1 tablet by mouth in the morning and at bedtime for 120 days. Max Daily Amount: 129.6 mg         Last Office Visit:   9/25/2024      Next Visit Date:  Future Appointments   Date Time Provider Department Center   3/26/2025  2:00 PM Adam Alonzo MD [place] [facility] -               Last refill 2/10/25. Please approve or deny.

## 2025-03-26 ENCOUNTER — OFFICE VISIT (OUTPATIENT)
Dept: NEUROLOGY | Age: 70
End: 2025-03-26
Payer: MEDICARE

## 2025-03-26 VITALS
WEIGHT: 293 LBS | HEART RATE: 71 BPM | BODY MASS INDEX: 58.24 KG/M2 | DIASTOLIC BLOOD PRESSURE: 70 MMHG | SYSTOLIC BLOOD PRESSURE: 124 MMHG

## 2025-03-26 DIAGNOSIS — G40.309 GENERALIZED CONVULSIVE EPILEPSY (HCC): Primary | ICD-10-CM

## 2025-03-26 DIAGNOSIS — R27.0 ATAXIA: ICD-10-CM

## 2025-03-26 DIAGNOSIS — G40.409 OTHER GENERALIZED EPILEPSY AND EPILEPTIC SYNDROMES, NOT INTRACTABLE, WITHOUT STATUS EPILEPTICUS (HCC): ICD-10-CM

## 2025-03-26 DIAGNOSIS — G93.89 ENCEPHALOMALACIA: ICD-10-CM

## 2025-03-26 DIAGNOSIS — M79.10 MYALGIA: ICD-10-CM

## 2025-03-26 DIAGNOSIS — G40.309 NONINTRACTABLE GENERALIZED IDIOPATHIC EPILEPSY WITHOUT STATUS EPILEPTICUS (HCC): ICD-10-CM

## 2025-03-26 LAB
CK SERPL-CCNC: 274 U/L (ref 0–170)
CRP SERPL HS-MCNC: 9.2 MG/L (ref 0–5)
ERYTHROCYTE [SEDIMENTATION RATE] IN BLOOD BY WESTERGREN METHOD: 25 MM (ref 0–30)
PHENYTOIN SERPL-MCNC: 8.4 UG/ML (ref 10–20)
TSH REFLEX: 3.02 UIU/ML (ref 0.44–3.86)

## 2025-03-26 PROCEDURE — 99214 OFFICE O/P EST MOD 30 MIN: CPT | Performed by: PSYCHIATRY & NEUROLOGY

## 2025-03-26 PROCEDURE — G8417 CALC BMI ABV UP PARAM F/U: HCPCS | Performed by: PSYCHIATRY & NEUROLOGY

## 2025-03-26 PROCEDURE — G8427 DOCREV CUR MEDS BY ELIG CLIN: HCPCS | Performed by: PSYCHIATRY & NEUROLOGY

## 2025-03-26 PROCEDURE — 1036F TOBACCO NON-USER: CPT | Performed by: PSYCHIATRY & NEUROLOGY

## 2025-03-26 PROCEDURE — G8400 PT W/DXA NO RESULTS DOC: HCPCS | Performed by: PSYCHIATRY & NEUROLOGY

## 2025-03-26 PROCEDURE — 3078F DIAST BP <80 MM HG: CPT | Performed by: PSYCHIATRY & NEUROLOGY

## 2025-03-26 PROCEDURE — 3074F SYST BP LT 130 MM HG: CPT | Performed by: PSYCHIATRY & NEUROLOGY

## 2025-03-26 PROCEDURE — 1159F MED LIST DOCD IN RCRD: CPT | Performed by: PSYCHIATRY & NEUROLOGY

## 2025-03-26 PROCEDURE — 3017F COLORECTAL CA SCREEN DOC REV: CPT | Performed by: PSYCHIATRY & NEUROLOGY

## 2025-03-26 PROCEDURE — 1090F PRES/ABSN URINE INCON ASSESS: CPT | Performed by: PSYCHIATRY & NEUROLOGY

## 2025-03-26 PROCEDURE — 1123F ACP DISCUSS/DSCN MKR DOCD: CPT | Performed by: PSYCHIATRY & NEUROLOGY

## 2025-03-26 ASSESSMENT — ENCOUNTER SYMPTOMS
PHOTOPHOBIA: 0
SHORTNESS OF BREATH: 0
COLOR CHANGE: 0
NAUSEA: 0
VOMITING: 0
TROUBLE SWALLOWING: 0
CHOKING: 0
BACK PAIN: 0

## 2025-03-26 NOTE — PROGRESS NOTES
Subjective:      Patient ID: Penny Suresh is a 70 y.o. female who presents today for:  Chief Complaint   Patient presents with    6 Month Follow-Up     Pt had a fall when she was coming in. Pt states things are ok no seizures. 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  Patient also has obstructive sleep with hypersomnolence.  We have her on phenobarbital 64.8 mg twice a day.  Patient has encephalomalacia patient is on phenytoin 40 mg at night.  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  Social History     Socioeconomic History    Marital status: Single     Spouse name: Not on file    Number of children: Not on file    Years of education: Not on file    Highest education level: Not on file   Occupational History    Not on file   Tobacco Use    Smoking status: Never    Smokeless tobacco: Never   Substance and Sexual Activity    Alcohol use: No    Drug use: No    Sexual activity: Not on file   Other Topics Concern    Not on file   Social History Narrative    Not on file     Social Drivers of Health     Financial Resource Strain: Not on file   Food Insecurity: Not on file   Transportation Needs: Not on file   Physical Activity: Not on file   Stress: Not on file   Social Connections: Not on file

## 2025-04-08 ENCOUNTER — TELEPHONE (OUTPATIENT)
Age: 70
End: 2025-04-08

## 2025-04-08 NOTE — TELEPHONE ENCOUNTER
Pt needs a BMV form filled out for her seizures. Last seizure pt states was in 1976. Ok to fill out form, please advise.

## 2025-04-17 RX ORDER — PHENYTOIN SODIUM 100 MG/1
400 CAPSULE, EXTENDED RELEASE ORAL NIGHTLY
Qty: 60 CAPSULE | Refills: 1 | Status: SHIPPED | OUTPATIENT
Start: 2025-04-17

## 2025-04-17 NOTE — TELEPHONE ENCOUNTER
Requesting medication refill. Please approve or deny this request.    Rx requested:  Requested Prescriptions     Pending Prescriptions Disp Refills    phenytoin (DILANTIN) 100 MG ER capsule 60 capsule 1     Sig: Take 4 capsules by mouth at bedtime         Last Office Visit:   3/26/2025      Next Visit Date:  Future Appointments   Date Time Provider Department Center   9/25/2025  2:30 PM Adam Alonzo MD [place] [facility] -

## 2025-04-28 RX ORDER — PHENYTOIN SODIUM 100 MG/1
400 CAPSULE, EXTENDED RELEASE ORAL NIGHTLY
Qty: 360 CAPSULE | Refills: 0 | Status: SHIPPED | OUTPATIENT
Start: 2025-04-28

## 2025-04-28 NOTE — TELEPHONE ENCOUNTER
Other refill was cancelled at [facility] it was only a 15 day supply    Patient requesting 90 day medication refill. Please approve or deny this request.    Rx requested:  Requested Prescriptions     Pending Prescriptions Disp Refills    phenytoin (DILANTIN) 100 MG ER capsule 360 capsule 0     Sig: Take 4 capsules by mouth at bedtime         Last Office Visit:   3/26/2025      Next Visit Date:  Future Appointments   Date Time Provider Department Center   9/25/2025  2:30 PM Adam Alonzo MD [place] [facility] -

## 2025-07-08 DIAGNOSIS — G40.309 GENERALIZED CONVULSIVE EPILEPSY (HCC): ICD-10-CM

## 2025-07-08 DIAGNOSIS — I10 PRIMARY HYPERTENSION: ICD-10-CM

## 2025-07-08 RX ORDER — IRBESARTAN 150 MG/1
150 TABLET ORAL DAILY
Qty: 90 TABLET | Refills: 3 | OUTPATIENT
Start: 2025-07-08

## 2025-07-08 NOTE — TELEPHONE ENCOUNTER
Requesting medication refill. Please approve or deny this request.    Rx requested:  Requested Prescriptions     Pending Prescriptions Disp Refills    PHENobarbital 64.8 MG tablet 60 tablet 3     Sig: Take 1 tablet by mouth in the morning and at bedtime for 120 days. Max Daily Amount: 129.6 mg    phenytoin (DILANTIN) 100 MG ER capsule 360 capsule 0     Sig: Take 4 capsules by mouth at bedtime         Last Office Visit:   3/26/2025      Next Visit Date:  Future Appointments   Date Time Provider Department Center   9/25/2025  2:30 PM Adam Alonzo MD [place] [facility] -

## 2025-07-09 RX ORDER — PHENOBARBITAL 64.8 MG/1
64.8 TABLET ORAL 2 TIMES DAILY
Qty: 60 TABLET | Refills: 3 | Status: SHIPPED | OUTPATIENT
Start: 2025-07-09 | End: 2025-11-06

## 2025-07-09 RX ORDER — PHENYTOIN SODIUM 100 MG/1
400 CAPSULE, EXTENDED RELEASE ORAL NIGHTLY
Qty: 360 CAPSULE | Refills: 0 | Status: SHIPPED | OUTPATIENT
Start: 2025-07-09

## 2025-07-30 ENCOUNTER — APPOINTMENT (OUTPATIENT)
Dept: CARDIOLOGY | Facility: CLINIC | Age: 70
End: 2025-07-30
Payer: MEDICARE

## 2025-08-14 DIAGNOSIS — I10 PRIMARY HYPERTENSION: ICD-10-CM

## 2025-08-14 DIAGNOSIS — E78.2 MIXED HYPERLIPIDEMIA: ICD-10-CM

## 2025-08-14 RX ORDER — IRBESARTAN 150 MG/1
150 TABLET ORAL DAILY
Qty: 90 TABLET | Refills: 0 | Status: SHIPPED | OUTPATIENT
Start: 2025-08-14 | End: 2026-08-14

## 2025-08-14 RX ORDER — ATORVASTATIN CALCIUM 40 MG/1
40 TABLET, FILM COATED ORAL DAILY
Qty: 90 TABLET | Refills: 0 | OUTPATIENT
Start: 2025-08-14

## 2025-08-14 RX ORDER — IRBESARTAN 150 MG/1
150 TABLET ORAL DAILY
Qty: 90 TABLET | Refills: 0 | Status: CANCELLED | OUTPATIENT
Start: 2025-08-14

## 2025-09-10 ENCOUNTER — APPOINTMENT (OUTPATIENT)
Dept: CARDIOLOGY | Facility: CLINIC | Age: 70
End: 2025-09-10
Payer: MEDICARE